# Patient Record
Sex: MALE | Race: WHITE | Employment: OTHER | URBAN - METROPOLITAN AREA
[De-identification: names, ages, dates, MRNs, and addresses within clinical notes are randomized per-mention and may not be internally consistent; named-entity substitution may affect disease eponyms.]

---

## 2017-09-19 ENCOUNTER — OP HISTORICAL/CONVERTED ENCOUNTER (OUTPATIENT)
Dept: OTHER | Age: 77
End: 2017-09-19

## 2018-07-31 ENCOUNTER — OP HISTORICAL/CONVERTED ENCOUNTER (OUTPATIENT)
Dept: OTHER | Age: 78
End: 2018-07-31

## 2018-09-20 ENCOUNTER — OP HISTORICAL/CONVERTED ENCOUNTER (OUTPATIENT)
Dept: OTHER | Age: 78
End: 2018-09-20

## 2019-09-27 ENCOUNTER — OP HISTORICAL/CONVERTED ENCOUNTER (OUTPATIENT)
Dept: OTHER | Age: 79
End: 2019-09-27

## 2020-01-16 ENCOUNTER — OP HISTORICAL/CONVERTED ENCOUNTER (OUTPATIENT)
Dept: OTHER | Age: 80
End: 2020-01-16

## 2020-01-28 ENCOUNTER — OP HISTORICAL/CONVERTED ENCOUNTER (OUTPATIENT)
Dept: OTHER | Age: 80
End: 2020-01-28

## 2020-08-21 VITALS
HEIGHT: 71 IN | HEART RATE: 68 BPM | WEIGHT: 216.8 LBS | DIASTOLIC BLOOD PRESSURE: 64 MMHG | SYSTOLIC BLOOD PRESSURE: 144 MMHG | BODY MASS INDEX: 30.35 KG/M2 | OXYGEN SATURATION: 98 %

## 2020-08-21 PROBLEM — H93.90 EAR PROBLEM: Status: ACTIVE | Noted: 2020-08-21

## 2020-08-21 PROBLEM — I10 HTN (HYPERTENSION): Status: ACTIVE | Noted: 2020-08-21

## 2020-08-21 PROBLEM — H91.90 DECREASED HEARING: Status: ACTIVE | Noted: 2020-08-21

## 2020-08-21 PROBLEM — E78.00 HIGH CHOLESTEROL: Status: ACTIVE | Noted: 2020-08-21

## 2020-10-22 RX ORDER — POTASSIUM CHLORIDE 1500 MG/1
TABLET, EXTENDED RELEASE ORAL
Qty: 90 TAB | Refills: 3 | Status: SHIPPED | OUTPATIENT
Start: 2020-10-22 | End: 2021-12-17

## 2021-03-08 DIAGNOSIS — I10 ESSENTIAL (PRIMARY) HYPERTENSION: ICD-10-CM

## 2021-03-09 RX ORDER — AMLODIPINE BESYLATE 5 MG/1
TABLET ORAL
Qty: 90 TAB | Refills: 4 | Status: SHIPPED | OUTPATIENT
Start: 2021-03-09 | End: 2021-04-15 | Stop reason: SDUPTHER

## 2021-03-22 ENCOUNTER — OFFICE VISIT (OUTPATIENT)
Dept: FAMILY MEDICINE CLINIC | Age: 81
End: 2021-03-22
Payer: MEDICARE

## 2021-03-22 VITALS
WEIGHT: 213 LBS | TEMPERATURE: 97.7 F | OXYGEN SATURATION: 96 % | DIASTOLIC BLOOD PRESSURE: 73 MMHG | BODY MASS INDEX: 29.82 KG/M2 | SYSTOLIC BLOOD PRESSURE: 139 MMHG | HEIGHT: 71 IN | HEART RATE: 56 BPM

## 2021-03-22 DIAGNOSIS — I48.91 ATRIAL FIBRILLATION, UNSPECIFIED TYPE (HCC): ICD-10-CM

## 2021-03-22 DIAGNOSIS — Z12.5 SCREENING FOR PROSTATE CANCER: ICD-10-CM

## 2021-03-22 DIAGNOSIS — C61 PRIMARY MALIGNANT NEOPLASM OF PROSTATE (HCC): ICD-10-CM

## 2021-03-22 DIAGNOSIS — E78.00 HIGH CHOLESTEROL: ICD-10-CM

## 2021-03-22 DIAGNOSIS — Z00.00 MEDICARE ANNUAL WELLNESS VISIT, SUBSEQUENT: ICD-10-CM

## 2021-03-22 DIAGNOSIS — I10 ESSENTIAL (PRIMARY) HYPERTENSION: Primary | ICD-10-CM

## 2021-03-22 DIAGNOSIS — M16.0 PRIMARY OSTEOARTHRITIS OF BOTH HIPS: ICD-10-CM

## 2021-03-22 DIAGNOSIS — Z13.31 SCREENING FOR DEPRESSION: ICD-10-CM

## 2021-03-22 DIAGNOSIS — Z13.39 SCREENING FOR ALCOHOLISM: ICD-10-CM

## 2021-03-22 PROCEDURE — G0442 ANNUAL ALCOHOL SCREEN 15 MIN: HCPCS | Performed by: FAMILY MEDICINE

## 2021-03-22 PROCEDURE — G8754 DIAS BP LESS 90: HCPCS | Performed by: FAMILY MEDICINE

## 2021-03-22 PROCEDURE — 1101F PT FALLS ASSESS-DOCD LE1/YR: CPT | Performed by: FAMILY MEDICINE

## 2021-03-22 PROCEDURE — G8510 SCR DEP NEG, NO PLAN REQD: HCPCS | Performed by: FAMILY MEDICINE

## 2021-03-22 PROCEDURE — 99213 OFFICE O/P EST LOW 20 MIN: CPT | Performed by: FAMILY MEDICINE

## 2021-03-22 PROCEDURE — G8427 DOCREV CUR MEDS BY ELIG CLIN: HCPCS | Performed by: FAMILY MEDICINE

## 2021-03-22 PROCEDURE — G8419 CALC BMI OUT NRM PARAM NOF/U: HCPCS | Performed by: FAMILY MEDICINE

## 2021-03-22 PROCEDURE — G0444 DEPRESSION SCREEN ANNUAL: HCPCS | Performed by: FAMILY MEDICINE

## 2021-03-22 PROCEDURE — G8536 NO DOC ELDER MAL SCRN: HCPCS | Performed by: FAMILY MEDICINE

## 2021-03-22 PROCEDURE — G0439 PPPS, SUBSEQ VISIT: HCPCS | Performed by: FAMILY MEDICINE

## 2021-03-22 PROCEDURE — G8752 SYS BP LESS 140: HCPCS | Performed by: FAMILY MEDICINE

## 2021-03-22 RX ORDER — ATORVASTATIN CALCIUM 10 MG/1
TABLET, FILM COATED ORAL
COMMUNITY
Start: 2021-01-15

## 2021-03-22 RX ORDER — AMIODARONE HYDROCHLORIDE 200 MG/1
TABLET ORAL
COMMUNITY
Start: 2021-03-08

## 2021-03-22 NOTE — PROGRESS NOTES
This is the Subsequent Medicare Annual Wellness Exam, performed 12 months or more after the Initial AWV or the last Subsequent AWV    I have reviewed the patient's medical history in detail and updated the computerized patient record. Depression Risk Factor Screening:     3 most recent PHQ Screens 3/22/2021   Little interest or pleasure in doing things Not at all   Feeling down, depressed, irritable, or hopeless Several days   Total Score PHQ 2 1       Alcohol Risk Screen    Do you average more than 1 drink per night or more than 7 drinks a week: No    In the past three months have you have had more than 4 drinks containing alcohol on one occasion: No        Functional Ability and Level of Safety:    Hearing: Hearing is good. The patient wears hearing aids. Activities of Daily Living: The home contains: handrails and grab bars  Patient does total self care      Ambulation: with no difficulty     Fall Risk:  Fall Risk Assessment, last 12 mths 3/22/2021   Able to walk? Yes   Fall in past 12 months? 0   Do you feel unsteady? 1   Are you worried about falling 0   Is the gait abnormal? 0      Abuse Screen:  Patient is not abused       Cognitive Screening    Has your family/caregiver stated any concerns about your memory: yes - \"slowing down\" mom had alzeihmers. Cognitive Screening: Normal - MMSE (Mini Mental Status Exam), Clock Drawing Test    Assessment/Plan   Education and counseling provided:  Are appropriate based on today's review and evaluation    Diagnoses and all orders for this visit:    1. Medicare annual wellness visit, subsequent    2.  Screening for alcoholism  -     ID ANNUAL ALCOHOL SCREEN 15 MIN    3. Screening for depression  -     [place] Maintenance Due     Health Maintenance Due   Topic Date Due    Lipid Screen  Never done    COVID-19 Vaccine (1) Never done    DTaP/Tdap/Td series (1 - Tdap) Never done    Shingrix Vaccine Age 50> (1 of 2) Never done   Juliano.Bran Flu Vaccine (1) 09/01/2020       Patient Care Team   Patient Care Team:  Venu Letser DO as PCP - General (Family Medicine)  Venu Lester DO as PCP - [facility] Empaneled Provider    History     Patient Active Problem List   Diagnosis Code    DJD (degenerative joint disease) of hip M16.9    Ear problem H93.90    Decreased hearing H91.90    HTN (hypertension) I10    High cholesterol E78.00     Past Medical History:   Diagnosis Date    Arrhythmia 9/3/14    RECENT DX OF ATRIAL FIBRILLATION    Cancer ([facility] [address]     PROSTATE.  Decreased hearing 8/21/2020    Ear problem 8/21/2020    High cholesterol 8/21/2020    HTN (hypertension) 8/21/2020    Hypertension     CONTROLLED BY MEDS.  Other ill-defined conditions(799.89)     Other ill-defined conditions(799.89)     hypercholesterolemia      Past Surgical History:   Procedure Laterality Date    HX APPENDECTOMY      HX CATARACT REMOVAL      both eyes    HX HEENT      repair of left eye detached retina    HX ORTHOPAEDIC      left knee replacement    HX PROSTATECTOMY  5/8/02     Current Outpatient Medications   Medication Sig Dispense Refill    amiodarone (CORDARONE) 200 mg tablet TAKE 1/2 TABLET BY MOUTH DAILY      atorvastatin (LIPITOR) 10 mg tablet TAKE 1 TABLET BY MOUTH EVERY DAY      amLODIPine (NORVASC) 5 mg tablet TAKE 1 TABLET BY MOUTH EVERY DAY 90 Tab 4    Klor-Con M20 20 mEq tablet TAKE 1 TABLET BY MOUTH EVERY DAY 90 Tab 3    lisinopril (PRINIVIL, ZESTRIL) 40 mg tablet Take 40 mg by mouth daily.  Hydrochlorothiazide 12.5 mg tablet Take 12.5 mg by mouth daily.  oxyCODONE IR (ROXICODONE) 5 mg immediate release tablet Take 1-2 tablets by mouth every four (4) hours as needed for Pain. 80 tablet 0    rivaroxaban (XARELTO) 20 mg tab tablet Take 20 mg by mouth daily (with dinner).  carvedilol (COREG) 12.5 mg tablet Take 12.5 mg by mouth two (2) times daily (with meals).  Takes in am & pm      simvastatin (ZOCOR) 20 mg tablet Take 20 mg by mouth nightly. No Known Allergies    Family History   Problem Relation Age of Onset    Heart Disease Mother     Cancer Mother         NOT SURE. GYN ISSUES.  Stroke Father      Social History     Tobacco Use    Smoking status: Never Smoker    Smokeless tobacco: Never Used   Substance Use Topics    Alcohol use: Yes     Alcohol/week: 0.8 standard drinks     Types: 1 Cans of beer per week     Comment: 1-2 per month   This note for annual wellness visit has been reviewed in its entirety and is dicussed thoroughly in my note including 3 word recall, clock draw test, discussion of advance directive , primary prevention, vaccinations, all age and gender appropriate screening exams, and safety concerns. Orders generated in My note if appropriate.     Asa Ramey, DO

## 2021-03-22 NOTE — PATIENT INSTRUCTIONS
Medicare Wellness Visit, Male The best way to live healthy is to have a lifestyle where you eat a well-balanced diet, exercise regularly, limit alcohol use, and quit all forms of tobacco/nicotine, if applicable. Regular preventive services are another way to keep healthy. Preventive services (vaccines, screening tests, monitoring & exams) can help personalize your care plan, which helps you manage your own care. Screening tests can find health problems at the earliest stages, when they are easiest to treat. [facility] follows the current, evidence-based guidelines published by the [facility] Darius Kamila ([facility]) when recommending preventive services for our patients. Because we follow these guidelines, sometimes recommendations change over time as research supports it. (For example, a prostate screening blood test is no longer routinely recommended for men with no symptoms). Of course, you and your doctor may decide to screen more often for some diseases, based on your risk and co-morbidities (chronic disease you are already diagnosed with). Preventive services for you include: - Medicare offers their members a free annual wellness visit, which is time for you and your primary care provider to discuss and plan for your preventive service needs. Take advantage of this benefit every year! 
-All adults over age 72 should receive the recommended pneumonia vaccines. Current USPSTF guidelines recommend a series of two vaccines for the best pneumonia protection.  
-All adults should have a flu vaccine yearly and tetanus vaccine every 10 years. 
-All adults age 48 and older should receive the shingles vaccines (series of two vaccines).       
-All adults age 38-68 who are overweight should have a diabetes screening test once every three years.  
-Other screening tests & preventive services for persons with diabetes include: an eye exam to screen for diabetic retinopathy, a kidney function test, a foot exam, and stricter control over your cholesterol.  
-Cardiovascular screening for adults with routine risk involves an electrocardiogram (ECG) at intervals determined by the provider.  
-Colorectal cancer screening should be done for adults age 54-65 with no increased risk factors for colorectal cancer. There are a number of acceptable methods of screening for this type of cancer. Each test has its own benefits and drawbacks. Discuss with your provider what is most appropriate for you during your annual wellness visit. The different tests include: colonoscopy (considered the best screening method), a fecal occult blood test, a fecal DNA test, and sigmoidoscopy. 
-All adults born between [facility] should be screened once for Hepatitis C. 
-An Abdominal Aortic Aneurysm (AAA) Screening is recommended for men age 73-68 who has ever smoked in their lifetime. Here is a list of your current Health Maintenance items (your personalized list of preventive services) with a due date: 
Health Maintenance Due Topic Date Due  Cholesterol Test   Never done  COVID-19 Vaccine (1) Never done  DTaP/Tdap/Td  (1 - Tdap) Never done  Shingles Vaccine (1 of 2) Never done  Yearly Flu Vaccine (1) 09/01/2020 General Health and concerns: HEART HEALTHY DIET: 
A heart healthy diet is one that is low in cholesterol (less than 300 mg daily), fat (less than 80 g daily) . You should also minimize carbohydrates / sugars (less amounts of breads, pastas, potato and potato products and sugary foods/snacks, cookies, cakes, etc) . Try to eat whole wheat/multigrain breads and pastas and eat more vegetables. Cook with olive oil (or no oil) and grill, bake, broil or boil foods. Less red meat and more chicken , fish and lean cuts of beef (limited). 0495-7097 calories per day is sufficient 0764-6001 is acceptable for weight loss.    
 
EXERCISE: 
You should do exercise 3-5 days per week (minimum) to include increasing your heart rate for 30 to 45 minutes. At least a pace of a brisk walk should do that. This build up your heart and lung endurance and muscles and helps many function of the body. OTHER: 
     
Routine Health maintenance: You need to get a yearly follow up/physical exam to review, discuss age and gender appropriate exams, labs, vaccines and screening tests. This includes cardiovascular health risk, cancer screens and other gus related topics. Medications-Take all medications as directed. Please do not stop unless you talk to your doctor or health care provider first. Report any problems immediately. Referrals: if you have been given a referral, please call the office if you do not hear from provider in one week. You may make the appointment yourself. Please keep all appointments with specialists and ask them to send their notes, thoughts, recommendations to us , as your PCP. Imaging/Labs:  Be sure to get these images in a timely manner. IF your test must be scheduled, let us know if you need help getting this done and if you do not hear from that provider in a week , call us or them. BE SURE to call the office if you do not hear regarding the results in one week after the test is performed Image or lab). It is our intention to inform you of the results ALWAYS, even if normal you should get a notification (Call, portal message). PLEASE amara if you do not get the results. PLEASE follow all recommendations and call/come in /ask questions if you do not understand of if problems develop after or in between visits. Failure to comply with recommended health care advise could result in serious health consequences. Thank you for choosing our practice and please let us know how we can help you feel better and stay well!

## 2021-03-22 NOTE — PROGRESS NOTES
IDENTIFYING INFORMATION:  Merrick Angelo , 78 y.o., male  [address] [facility],  [place]     CHIEF COMPLAINT:   Chief Complaint   Patient presents with    Annual Wellness Visit    Knee Pain     c/o knee pain     HISTORY OF PRESENT ILLNESS:  Merrick Angelo is a 78 y.o. male  has a past medical history of Arrhythmia (9/3/14), Cancer ([facility] [address] Decreased hearing (8/21/2020), Ear problem (8/21/2020), High cholesterol (8/21/2020), HTN (hypertension) (8/21/2020), Hypertension, Other ill-defined conditions(799.89), and Other ill-defined conditions(799.89). .  he comes in for follow up on htn, lipids and needs some labs. Does have some urinary frequency and gets up 3-4 times at night to urinate. History of prostate cancer with prostatectomy. He is fairly active for his age . Sees cardiology also   NO chest pain, short of breath or edema. PAST MEDICAL HISTORY:   Past Medical History:   Diagnosis Date    Arrhythmia 9/3/14    RECENT DX OF ATRIAL FIBRILLATION    Cancer ([facility] [address]     PROSTATE.  Decreased hearing 8/21/2020    Ear problem 8/21/2020    High cholesterol 8/21/2020    HTN (hypertension) 8/21/2020    Hypertension     CONTROLLED BY MEDS.  Other ill-defined conditions(799.89)     Other ill-defined conditions(799.89)     hypercholesterolemia       MEDICATIONS:   Current Outpatient Medications on File Prior to Visit   Medication Sig Dispense Refill    amiodarone (CORDARONE) 200 mg tablet TAKE 1/2 TABLET BY MOUTH DAILY      atorvastatin (LIPITOR) 10 mg tablet TAKE 1 TABLET BY MOUTH EVERY DAY      amLODIPine (NORVASC) 5 mg tablet TAKE 1 TABLET BY MOUTH EVERY DAY 90 Tab 4    Klor-Con M20 20 mEq tablet TAKE 1 TABLET BY MOUTH EVERY DAY 90 Tab 3    lisinopril (PRINIVIL, ZESTRIL) 40 mg tablet Take 40 mg by mouth daily.  Hydrochlorothiazide 12.5 mg tablet Take 12.5 mg by mouth daily.       oxyCODONE IR (ROXICODONE) 5 mg immediate release tablet Take 1-2 tablets by mouth every four (4) hours as needed for Pain. 80 tablet 0    [DISCONTINUED] potassium chloride SR (K-TAB) 20 mEq tablet Take 10 mEq by mouth daily. Indications: low amount of potassium in the blood      rivaroxaban (XARELTO) 20 mg tab tablet Take 20 mg by mouth daily (with dinner).  carvedilol (COREG) 12.5 mg tablet Take 12.5 mg by mouth two (2) times daily (with meals). Takes in am & pm      simvastatin (ZOCOR) 20 mg tablet Take 20 mg by mouth nightly. No current facility-administered medications on file prior to visit. ALLERGIES:  No Known Allergies      SOCIAL HISTORY:   Social History     Tobacco Use    Smoking status: Never Smoker    Smokeless tobacco: Never Used   Substance Use Topics    Alcohol use: Yes     Alcohol/week: 0.8 standard drinks     Types: 1 Cans of beer per week     Frequency: Monthly or less     Drinks per session: 3 or 4     Binge frequency: Less than monthly     Comment: 1-2 per month    Drug use: No       SURGICAL HISTORY:  Past Surgical History:   Procedure Laterality Date    HX APPENDECTOMY      HX CATARACT REMOVAL      both eyes    HX HEENT      repair of left eye detached retina    HX ORTHOPAEDIC      left knee replacement    HX PROSTATECTOMY  5/8/02        FAMILY HISTORY:  Family History   Problem Relation Age of Onset    Heart Disease Mother     Cancer Mother         NOT SURE. GYN ISSUES.  Stroke Father          REVIEW OF SYSTEMS:  I personally collected this information from all available source present (patient/others in room and records available) -JLEWBETTY    Review of Systems   Constitutional: Positive for fatigue. Negative for activity change, appetite change, chills, diaphoresis, fever and unexpected weight change. HENT: Negative for congestion, ear discharge, ear pain, hearing loss, rhinorrhea, sinus pressure, sneezing, sore throat, tinnitus, trouble swallowing and voice change. Eyes: Negative for photophobia, pain, discharge and visual disturbance.    Respiratory: Positive for shortness of breath (with long exertional). Negative for cough, chest tightness and wheezing. Cardiovascular: Negative for chest pain, palpitations and leg swelling. Gastrointestinal: Negative for abdominal distention, abdominal pain, blood in stool, constipation, diarrhea, nausea and vomiting. Endocrine: Negative for cold intolerance, heat intolerance, polydipsia and polyphagia. Genitourinary: Negative for difficulty urinating, dysuria, frequency, hematuria and urgency. Musculoskeletal: Positive for arthralgias, back pain, joint swelling and myalgias. Negative for gait problem and neck pain. Skin: Negative for color change and rash. Neurological: Negative for dizziness, tremors, syncope, speech difficulty, weakness, light-headedness, numbness and headaches. Hematological: Negative for adenopathy. Does not bruise/bleed easily. Psychiatric/Behavioral: Negative for agitation, behavioral problems, confusion, decreased concentration, dysphoric mood, hallucinations, sleep disturbance and suicidal ideas. The patient is not nervous/anxious. PHYSICAL EXAMINATION:  Vital Signs:    Visit Vitals  /73 (BP 1 Location: Left upper arm, BP Patient Position: Sitting)   Pulse (!) 56   Temp 97.7 °F (36.5 °C) (Temporal)   Ht 5' 11\" (1.803 m)   Wt 213 lb (96.6 kg)   SpO2 96%   BMI 29.71 kg/m²         Wt Readings from Last 3 Encounters:   03/22/21 213 lb (96.6 kg)   02/25/20 216 lb 12.8 oz (98.3 kg)   09/17/14 212 lb (96.2 kg)     BP Readings from Last 3 Encounters:   03/22/21 139/73   02/25/20 144/64   09/19/14 123/79         Physical Exam  Nursing note reviewed. Constitutional:       General: He is not in acute distress. Appearance: Normal appearance. He is obese. He is not ill-appearing or toxic-appearing. HENT:      Right Ear: Tympanic membrane normal.      Nose: Congestion present. No rhinorrhea.       Mouth/Throat:      Pharynx: No oropharyngeal exudate or posterior oropharyngeal erythema. Eyes:      General: No scleral icterus. Extraocular Movements: Extraocular movements intact. Conjunctiva/sclera: Conjunctivae normal.      Pupils: Pupils are equal, round, and reactive to light. Neck:      Musculoskeletal: No neck rigidity or muscular tenderness. Vascular: No carotid bruit. Cardiovascular:      Rate and Rhythm: Normal rate and regular rhythm. Heart sounds: No murmur. No friction rub. No gallop. Pulmonary:      Effort: Pulmonary effort is normal.      Breath sounds: Normal breath sounds. No wheezing, rhonchi or rales. Abdominal:      General: Bowel sounds are normal. There is no distension. Palpations: Abdomen is soft. There is no mass. Tenderness: There is no abdominal tenderness. Musculoskeletal:         General: Swelling, tenderness and deformity present. Right lower leg: No edema. Left lower leg: No edema. Lymphadenopathy:      Cervical: No cervical adenopathy. Skin:     Capillary Refill: Capillary refill takes less than 2 seconds. Coloration: Skin is not jaundiced. Findings: No erythema or rash. Neurological:      General: No focal deficit present. Mental Status: He is alert and oriented to person, place, and time. Mental status is at baseline. Cranial Nerves: No cranial nerve deficit. Sensory: No sensory deficit. Coordination: Coordination normal.      Gait: Gait normal.   Psychiatric:         Mood and Affect: Mood normal.         Behavior: Behavior normal.         Thought Content: Thought content normal.         Judgment: Judgment normal.     Three Word Registration and RECALL:      3/3  Clock Drawin/2  Total:             ASSESSMENT/PLAN:    Discussion (regarding today's visit with Apolinar Lang); ANNUAL WELLNESS VISIT PERFORMED  1. Nursing staff wellness visit note reviewed.     2.  Advanced directives were discussed with the patient and information was given if appropriate. 3. Tobacco use, alcohol screening, weight and body mass index, level of physical activity, nutrition, fall risk screenings were done. 4. We also reviewed and discussed vaccinations. Those were ordered if indicated and patient requested. 5. We discussed prostate specific antigen screening, digital rectal exam.  Those were ordered if indicated. 6. We discussed colon cancer screening, eye exam, depression screening, mental status/cognition and pain levels. Those items addressed with the patient were ordered this visit if indicated. 7. Mentation is in medical nursing staff note and my office visit. 8. Reviewed all information as noted. 9.  Depression screen was negative. 10.  Alcohol screen was negative. ICD-10-CM ICD-9-CM    1. Essential (primary) hypertension  I10 401.9 CBC WITH AUTOMATED DIFF      METABOLIC PANEL, COMPREHENSIVE      LIPID PANEL      TSH 3RD GENERATION      URINALYSIS W/ RFLX MICROSCOPIC   2. Medicare annual wellness visit, subsequent  Z00.00 V70.0    3. Screening for alcoholism  Z13.39 V79.1 [place] ANNUAL ALCOHOL SCREEN 15 MIN   4. Screening for depression  Z13.31 V79.0 DEPRESSION SCREEN ANNUAL   5. Primary osteoarthritis of both hips  M16.0 715.15    6. High cholesterol  E78.00 272.0    7. Screening for prostate cancer  Z12.5 V76.44 PSA SCREENING (SCREENING)   8. [facility], unspecified [facility]  I48.91 427.31    9. Primary malignant neoplasm of prostate ([facility]  C61 185      WE reviewed each diagnosis listed for today's visit including medications, treatment, testing such as labs, imagine, referrals and when to call regarding results and appointments. Reminded patient to keep any and all appointments with specialists, labs, imaging. Reminded patient to make sure we get copies of any specialists care, labs and imaging. Reminded patient to call of come by the office if there are any concerns, questions , comments or problems.     The patient verbalized understanding of the care plan and all questions were answered to the patient's satisfaction prior to leaving the office. The patient was told that failure to comply with recommended testing could result in abnormal health consequences. The patient was instructed to have yearly routine health maintenance including but not limited to age appropriate vaccines, testing, screening exams. ALL questions were answered to his satisfaction before leaving the office. The patient actively participated in medical decision making. FOLLOW UP:   Patient knows to keep any and all future visits scheduled unless told otherwise. Patient knows to call, come back if any concerns, questions, comments or problems arise. Follow-up and Dispositions    · Return in about 6 months (around 9/22/2021) for follow up lipids, afib, htn, lipids, oa knees. This visit may have been completed , in part, with voice recognition software as well as typing and may have syntax errors despite editing.       Renny Mast DO

## 2021-03-24 LAB
ALBUMIN SERPL-MCNC: 4.6 G/DL (ref 3.6–4.6)
ALBUMIN/GLOB SERPL: 1.6 {RATIO} (ref 1.2–2.2)
ALP SERPL-CCNC: 84 IU/L (ref 39–117)
ALT SERPL-CCNC: 13 IU/L (ref 0–44)
APPEARANCE UR: CLEAR
AST SERPL-CCNC: 17 IU/L (ref 0–40)
BASOPHILS # BLD AUTO: 0.1 X10E3/UL (ref 0–0.2)
BASOPHILS NFR BLD AUTO: 1 %
BILIRUB SERPL-MCNC: 0.8 MG/DL (ref 0–1.2)
BILIRUB UR QL STRIP: NEGATIVE
BUN SERPL-MCNC: 14 MG/DL (ref 8–27)
BUN/CREAT SERPL: 14 (ref 10–24)
CALCIUM SERPL-MCNC: 9.4 MG/DL (ref 8.6–10.2)
CHLORIDE SERPL-SCNC: 101 MMOL/L (ref 96–106)
CHOLEST SERPL-MCNC: 169 MG/DL (ref 100–199)
CO2 SERPL-SCNC: 27 MMOL/L (ref 20–29)
COLOR UR: YELLOW
CREAT SERPL-MCNC: 1.01 MG/DL (ref 0.76–1.27)
EOSINOPHIL # BLD AUTO: 0.3 X10E3/UL (ref 0–0.4)
EOSINOPHIL NFR BLD AUTO: 5 %
ERYTHROCYTE [DISTWIDTH] IN BLOOD BY AUTOMATED COUNT: 13.1 % (ref 11.6–15.4)
GLOBULIN SER CALC-MCNC: 2.8 G/DL (ref 1.5–4.5)
GLUCOSE SERPL-MCNC: 87 MG/DL (ref 65–99)
GLUCOSE UR QL: NEGATIVE
HCT VFR BLD AUTO: 46.1 % (ref 37.5–51)
HDLC SERPL-MCNC: 57 MG/DL
HGB BLD-MCNC: 15.2 G/DL (ref 13–17.7)
HGB UR QL STRIP: NEGATIVE
IMM GRANULOCYTES # BLD AUTO: 0.1 X10E3/UL (ref 0–0.1)
IMM GRANULOCYTES NFR BLD AUTO: 1 %
KETONES UR QL STRIP: NEGATIVE
LDLC SERPL CALC-MCNC: 92 MG/DL (ref 0–99)
LEUKOCYTE ESTERASE UR QL STRIP: NEGATIVE
LYMPHOCYTES # BLD AUTO: 1.1 X10E3/UL (ref 0.7–3.1)
LYMPHOCYTES NFR BLD AUTO: 17 %
MCH RBC QN AUTO: 28.8 PG (ref 26.6–33)
MCHC RBC AUTO-ENTMCNC: 33 G/DL (ref 31.5–35.7)
MCV RBC AUTO: 87 FL (ref 79–97)
MICRO URNS: NORMAL
MONOCYTES # BLD AUTO: 0.6 X10E3/UL (ref 0.1–0.9)
MONOCYTES NFR BLD AUTO: 9 %
NEUTROPHILS # BLD AUTO: 4.7 X10E3/UL (ref 1.4–7)
NEUTROPHILS NFR BLD AUTO: 67 %
NITRITE UR QL STRIP: NEGATIVE
PH UR STRIP: 7.5 [PH] (ref 5–7.5)
PLATELET # BLD AUTO: 322 X10E3/UL (ref 150–450)
POTASSIUM SERPL-SCNC: 3.2 MMOL/L (ref 3.5–5.2)
PROT SERPL-MCNC: 7.4 G/DL (ref 6–8.5)
PROT UR QL STRIP: NEGATIVE
PSA SERPL-MCNC: 0.4 NG/ML (ref 0–4)
RBC # BLD AUTO: 5.28 X10E6/UL (ref 4.14–5.8)
SODIUM SERPL-SCNC: 144 MMOL/L (ref 134–144)
SP GR UR: 1.02 (ref 1–1.03)
TRIGL SERPL-MCNC: 111 MG/DL (ref 0–149)
TSH SERPL DL<=0.005 MIU/L-ACNC: 1.27 UIU/ML (ref 0.45–4.5)
UROBILINOGEN UR STRIP-MCNC: 0.2 MG/DL (ref 0.2–1)
VLDLC SERPL CALC-MCNC: 20 MG/DL (ref 5–40)
WBC # BLD AUTO: 6.9 X10E3/UL (ref 3.4–10.8)

## 2021-03-30 NOTE — PROGRESS NOTES
Blood count is normal without anemia or infection. Sugar, kidneys, liver test is normal.  Cholesterol is excellent.   Thyroid, urinalysis, prostate are normal.

## 2021-04-13 RX ORDER — LISINOPRIL 40 MG/1
TABLET ORAL
Qty: 90 TAB | Refills: 4 | Status: SHIPPED | OUTPATIENT
Start: 2021-04-13 | End: 2022-04-18 | Stop reason: SDUPTHER

## 2021-04-15 DIAGNOSIS — I10 ESSENTIAL (PRIMARY) HYPERTENSION: ICD-10-CM

## 2021-04-15 RX ORDER — AMLODIPINE BESYLATE 5 MG/1
TABLET ORAL
Qty: 90 TAB | Refills: 4 | Status: SHIPPED | OUTPATIENT
Start: 2021-04-15 | End: 2022-04-18 | Stop reason: SDUPTHER

## 2021-04-21 PROBLEM — Z00.00 MEDICARE ANNUAL WELLNESS VISIT, SUBSEQUENT: Status: RESOLVED | Noted: 2021-03-22 | Resolved: 2021-04-21

## 2021-08-03 PROBLEM — I10 HTN (HYPERTENSION): Status: RESOLVED | Noted: 2020-08-21 | Resolved: 2021-08-03

## 2021-08-16 ENCOUNTER — OFFICE VISIT (OUTPATIENT)
Dept: ENT CLINIC | Age: 81
End: 2021-08-16
Payer: MEDICARE

## 2021-08-16 VITALS
DIASTOLIC BLOOD PRESSURE: 90 MMHG | OXYGEN SATURATION: 98 % | BODY MASS INDEX: 29.4 KG/M2 | TEMPERATURE: 98.4 F | WEIGHT: 210 LBS | SYSTOLIC BLOOD PRESSURE: 148 MMHG | RESPIRATION RATE: 16 BRPM | HEIGHT: 71 IN | HEART RATE: 61 BPM

## 2021-08-16 DIAGNOSIS — H92.01 OTALGIA, RIGHT: ICD-10-CM

## 2021-08-16 DIAGNOSIS — H61.23 BILATERAL IMPACTED CERUMEN: ICD-10-CM

## 2021-08-16 DIAGNOSIS — H90.3 SENSORINEURAL HEARING LOSS (SNHL), BILATERAL: Primary | ICD-10-CM

## 2021-08-16 PROCEDURE — G8755 DIAS BP > OR = 90: HCPCS | Performed by: OTOLARYNGOLOGY

## 2021-08-16 PROCEDURE — G8536 NO DOC ELDER MAL SCRN: HCPCS | Performed by: OTOLARYNGOLOGY

## 2021-08-16 PROCEDURE — G8753 SYS BP > OR = 140: HCPCS | Performed by: OTOLARYNGOLOGY

## 2021-08-16 PROCEDURE — 99213 OFFICE O/P EST LOW 20 MIN: CPT | Performed by: OTOLARYNGOLOGY

## 2021-08-16 PROCEDURE — 1101F PT FALLS ASSESS-DOCD LE1/YR: CPT | Performed by: OTOLARYNGOLOGY

## 2021-08-16 PROCEDURE — G8419 CALC BMI OUT NRM PARAM NOF/U: HCPCS | Performed by: OTOLARYNGOLOGY

## 2021-08-16 PROCEDURE — 69210 REMOVE IMPACTED EAR WAX UNI: CPT | Performed by: OTOLARYNGOLOGY

## 2021-08-16 PROCEDURE — G8432 DEP SCR NOT DOC, RNG: HCPCS | Performed by: OTOLARYNGOLOGY

## 2021-08-16 PROCEDURE — G8427 DOCREV CUR MEDS BY ELIG CLIN: HCPCS | Performed by: OTOLARYNGOLOGY

## 2021-08-16 NOTE — LETTER
8/17/2021    Patient: Cale Reed   YOB: 1940   Date of Visit: 8/16/2021     Cathy Alex DO  [address] [place] [id number]  [address] [place]    Dear Cathy Alex DO,      Thank you for referring Mr. Ortega Dominguez to [facility] [address] for evaluation. My notes for this consultation are attached. If you have questions, please do not hesitate to call me. I look forward to following your patient along with you.       Sincerely,    Jesus Lancaster MD

## 2021-08-16 NOTE — PROGRESS NOTES
Otolaryngology-Head and Neck Surgery  Follow Up Patient Visit     Patient: Oral Simon  YOB: 1940  MRN: [id number]  Date of Service:  8/16/2021    Chief Complaint:   Chief Complaint   Patient presents with    Hearing Problem     Patient is c/o decreased in hearing and pain in right ear.  Ear Pain         History of Present Illness: Oral Simon is a 78y.o. year old male who was presents for evaluation of his ears. Hx of hearing loss, wearing hearing aids only in the right ear. Notes recently some wax on hearing aid and sound sensitivity on the right    Right ear feels sensitive only when wearing aid, otherwise feels normal      Past Medical History:  Past Medical History:   Diagnosis Date    Arrhythmia 9/3/14    RECENT DX OF ATRIAL FIBRILLATION    Cancer ([facility] [place] [address]     PROSTATE.  Decreased hearing 8/21/2020    Ear problem 8/21/2020    High cholesterol 8/21/2020    HTN (hypertension) 8/21/2020    Hypertension     CONTROLLED BY MEDS.     Other ill-defined conditions(799.89)     Other ill-defined conditions(799.89)     hypercholesterolemia       Past Surgical History:   Past Surgical History:   Procedure Laterality Date    HX APPENDECTOMY      HX CATARACT REMOVAL      both eyes    HX HEENT      repair of left eye detached retina    HX ORTHOPAEDIC      left knee replacement    HX PROSTATECTOMY  5/8/02       Medications:   Current Outpatient Medications   Medication Instructions    amiodarone (CORDARONE) 200 mg tablet TAKE 1/2 TABLET BY MOUTH DAILY    amLODIPine (NORVASC) 5 mg tablet TAKE 1 TABLET BY MOUTH EVERY DAY    atorvastatin (LIPITOR) 10 mg tablet TAKE 1 TABLET BY MOUTH EVERY DAY    carvediloL (COREG) 12.5 mg, 2 TIMES DAILY WITH MEALS    hydroCHLOROthiazide (HYDRODIURIL) 12.5 mg, DAILY    Klor-Con M20 20 mEq tablet TAKE 1 TABLET BY MOUTH EVERY DAY    lisinopriL (PRINIVIL, ZESTRIL) 40 mg tablet TAKE 1 TABLET BY MOUTH EVERY DAY BEFORE A MEAL    oxyCODONE IR (Zoey Aguayo) 5-10 mg, Oral, EVERY 4 HOURS AS NEEDED    rivaroxaban (XARELTO) 20 mg, DAILY WITH DINNER    simvastatin (ZOCOR) 20 mg, EVERY BEDTIME       Allergies:   No Known Allergies    Social History:   Social History     Tobacco Use    Smoking status: Never Smoker    Smokeless tobacco: Never Used   Substance Use Topics    Alcohol use: Yes     Alcohol/week: 0.8 standard drinks     Types: 1 Cans of beer per week     Comment: 1-2 per month    Drug use: No       Family History:  Family History   Problem Relation Age of Onset    Heart Disease Mother     Cancer Mother         NOT SURE. GYN ISSUES.  Stroke Father        Review of Systems:  Consitutional: denies fever, excessive weight gain or loss. Eyes: denies diplopia, eye pain. Integumentary: denies new concerning skin lesions. Ears, Nose, Mouth, Throat: denies except as per HPI. Endocrine: denies hot or cold intolerance, increased thirst.  Respiratory: denies cough, hemoptysis, wheezing  Gastrointestinal: denies trouble swallowing, nausea, emesis, regurgitation  Musculoskeletal: denies muscle weakness or wasting  Cardiovascular: denies chest pain, shortness of breath  Neurologic: denies seizures, numbness or tingling, syncope  Hematologic: denies easy bleeding or bruising    Physical Examination:   Vitals:    08/16/21 0856   BP: (!) 148/90   BP 1 Location: Left upper arm   BP Patient Position: Sitting   BP Cuff Size: Large adult   Pulse: 61   Temp: 98.4 °F (36.9 °C)   TempSrc: Temporal   Resp: 16   Height: 5' 11\" (1.803 m)   Weight: 210 lb (95.3 kg)   SpO2: 98%         General: Comfortable, pleasant, appears stated age  Voice: Strong, speaking in full sentences, no stridor    Face: No masses or lesions, facial strength symmetric   Ears: External ears unremarkable. Bilateral medial cerumen impactions. Following debridement, TM clear and intact, with visible landmarks. Clear middle ear space  Nose: External nose unremarkable. Dorsum midline.  Anterior rhinoscopy demonstrates no lesions. Septum midline. Turbinates without hypertrophy. Oral Cavity / Oropharynx: No trismus. Mucosa pink and moist. No lesions. Tongue is midline and mobile. Palate elevates symmetrically. Uvula midline. Tonsils unremarkable. Base of tongue soft. Floor of mouth soft. Neck: Supple. No adenopathy. Thyroid unremarkable. Palpable laryngeal landmarks. Full neck range of motion   Neurologic: CN II - XI intact. Normal gait    PROCEDURE: BILATERAL MICROSCOPY WITH CERUMEN DEBRIDEMENT    Using the microscope, both ears were examined. A 5 Fr suction and right angle were used to debride the ears of cerumen revealing a clear and intact TM bilaterally. Patient tolerated the procedure well       Assessment and Plan:   1. Bilateral SNHL  2. Bilateral cerumen impactions  3. R otalgia   - Ears with bilateral medial cerumen impactions  - May be in part impacting fit of hearing aids causing discomfort  - Cerumen debrided as above  - If any pain or sensitivity or residual concerns re: hearing - would make appt with our audiologist to adjust aids and retest hearing   - Offered today, he'd like to see how he feels first        The patient was instructed to return to clinic if no improvement or progression of symptoms. Signs to watch out for reviewed.       MD Claudine Barberova [facility]  [address] [place] [address] 6  [facility]  Office Phone: [phone]

## 2021-08-16 NOTE — PROGRESS NOTES
Visit Vitals  BP (!) 148/90 (BP 1 Location: Left upper arm, BP Patient Position: Sitting, BP Cuff Size: Large adult)   Pulse 61   Temp 98.4 °F (36.9 °C) (Temporal)   Resp 16   Ht 5' 11\" (1.803 m)   Wt 210 lb (95.3 kg)   SpO2 98%   BMI 29.29 kg/m²     Chief Complaint   Patient presents with    Hearing Problem     Patient is c/o decreased in hearing and pain in right ear.  Ear Pain   Last seen by Dr Rasta Louis 2/25/2020.

## 2021-09-22 ENCOUNTER — HOSPITAL ENCOUNTER (OUTPATIENT)
Dept: GENERAL RADIOLOGY | Age: 81
Discharge: HOME | End: 2021-09-22
Payer: MEDICARE

## 2021-09-22 ENCOUNTER — OFFICE VISIT (OUTPATIENT)
Dept: FAMILY MEDICINE CLINIC | Age: 81
End: 2021-09-22
Payer: MEDICARE

## 2021-09-22 VITALS
TEMPERATURE: 97.3 F | OXYGEN SATURATION: 99 % | SYSTOLIC BLOOD PRESSURE: 154 MMHG | HEART RATE: 56 BPM | DIASTOLIC BLOOD PRESSURE: 78 MMHG | BODY MASS INDEX: 29.12 KG/M2 | HEIGHT: 71 IN | WEIGHT: 208 LBS

## 2021-09-22 DIAGNOSIS — M25.511 CHRONIC RIGHT SHOULDER PAIN: ICD-10-CM

## 2021-09-22 DIAGNOSIS — G89.29 CHRONIC RIGHT SHOULDER PAIN: ICD-10-CM

## 2021-09-22 DIAGNOSIS — I48.91 ATRIAL FIBRILLATION, UNSPECIFIED TYPE (HCC): ICD-10-CM

## 2021-09-22 DIAGNOSIS — E78.00 HIGH CHOLESTEROL: Primary | ICD-10-CM

## 2021-09-22 PROCEDURE — G8753 SYS BP > OR = 140: HCPCS | Performed by: FAMILY MEDICINE

## 2021-09-22 PROCEDURE — 99213 OFFICE O/P EST LOW 20 MIN: CPT | Performed by: FAMILY MEDICINE

## 2021-09-22 PROCEDURE — G8754 DIAS BP LESS 90: HCPCS | Performed by: FAMILY MEDICINE

## 2021-09-22 PROCEDURE — 73030 X-RAY EXAM OF SHOULDER: CPT

## 2021-09-22 PROCEDURE — G8419 CALC BMI OUT NRM PARAM NOF/U: HCPCS | Performed by: FAMILY MEDICINE

## 2021-09-22 PROCEDURE — G8536 NO DOC ELDER MAL SCRN: HCPCS | Performed by: FAMILY MEDICINE

## 2021-09-22 PROCEDURE — G8510 SCR DEP NEG, NO PLAN REQD: HCPCS | Performed by: FAMILY MEDICINE

## 2021-09-22 PROCEDURE — G8427 DOCREV CUR MEDS BY ELIG CLIN: HCPCS | Performed by: FAMILY MEDICINE

## 2021-09-22 PROCEDURE — 1101F PT FALLS ASSESS-DOCD LE1/YR: CPT | Performed by: FAMILY MEDICINE

## 2021-09-22 NOTE — PATIENT INSTRUCTIONS
General Health and concerns:  HEART HEALTHY DIET:  A heart healthy diet is one that is low in cholesterol (less than 300 mg daily), fat (less than 80 g daily) . You should also minimize carbohydrates / sugars (less amounts of breads, pastas, potato and potato products and sugary foods/snacks, cookies, cakes, etc) . Try to eat whole wheat/multigrain breads and pastas and eat more vegetables. Cook with olive oil (or no oil) and grill, bake, broil or boil foods. Less red meat and more chicken , fish and lean cuts of beef (limited). [id number] calories per day is sufficient [id number] is acceptable for weight loss. EXERCISE:  You should do exercise 3-5 days per week (minimum) to include increasing your heart rate for 30 to 45 minutes. At least a pace of a brisk walk should do that. This build up your heart and lung endurance and muscles and helps many function of the body. OTHER:    IF your condition(s) do not improve, get worse and/or if any concerns arise, please call or come by the office. Routine Health maintenance: You need to get a yearly follow up/physical exam to review, discuss age and gender appropriate exams, labs, vaccines and screening tests. This includes cardiovascular health risk, cancer screens and other gus related topics. Medications-Take all medications as directed. Please do not stop unless you talk to your doctor or health care provider first. Report any problems immediately. PLEASE CHECK THE LIST FROM TODAY's VISIT and make SURE IT IS ACCURATE-Please bring any issues to our concern IMMEDIATELY!! Referrals: if you have been given a referral, please call the office if you do not hear from provider in one week. You may make the appointment yourself. Please keep all appointments with specialists and ask them to send their notes, thoughts, recommendations to us , as your PCP.     KEEP all upcoming appointments with our office UNLESS otherwise and specifically told not to. CHECK your diagnosis/problem list for today and that orders and prescriptions are what we discussed as well as MAKE sure all information is accurate and has been discussed to your satisfaction. PLEASE make sure all your questions have been answered and feel free to call or come back should any concerns arise. Imaging/Labs:  Be sure to get these images in a timely manner. IF your test must be scheduled, let us know if you need help getting this done and if you do not hear from that provider in a week , call us or them. BE SURE to call the office if you do not hear regarding the results in one week after the test is performed Image or lab). It is our intention to inform you of the results ALWAYS, even if normal you should get a notification (Call, portal message). PLEASE amara if you do not get the results. PLEASE follow all recommendations and call/come in /ask questions if you do not understand of if problems develop after or in between visits. Failure to comply with recommended health care advise could result in serious health consequences. Thank you for choosing our practice and please let us know how we can help you feel better and stay well!

## 2021-09-22 NOTE — PROGRESS NOTES
1. Have you been to the ER, urgent care clinic since your last visit? Hospitalized since your last visit? No    2. Have you seen or consulted any other health care providers outside of the [address] [place] [address] since your last visit? Include any pap smears or colon screening. Dr. Mariaa Wheatley (Cardio)     Chief Complaint   Patient presents with    Cholesterol Problem    Hypertension    Irregular Heart Beat    Shoulder Pain     c/o right shoulder pain. Is not able to lift arm up past a certain point. Pain moves into collar bone.        Visit Vitals  BP (!) 154/91 (BP 1 Location: Left upper arm, BP Patient Position: Sitting)   Pulse 60   Temp 97.3 °F (36.3 °C) (Temporal)   Ht 5' 11\" (1.803 m)   Wt 208 lb (94.3 kg)   SpO2 99%   BMI 29.01 kg/m²

## 2021-09-22 NOTE — PROGRESS NOTES
*ATTENTION:  This note has been created by a medical student for educational purposes only. Please do not refer to the content of this note for clinical decision-making, billing, or other purposes. Please see attending physicians note to obtain clinical information on this patient. *     IDENTIFYING INFORMATION:      Merle Kim , 78 y.o., male  [address] [address]     Medical Record Number: [id number]          CHIEF COMPLAINT:     Chief Complaint   Patient presents with    Cholesterol Problem    Hypertension    Irregular Heart Beat    Shoulder Pain     c/o right shoulder pain. Is not able to lift arm up past a certain point. Pain moves into collar bone. HISTORY OF PRESENT ILLNESS:    Merle Kim is a 78 y.o. male  has a past medical history of Arrhythmia (9/3/14), Cancer ([facility] [address] Decreased hearing (8/21/2020), Ear problem (8/21/2020), High cholesterol (8/21/2020), HTN (hypertension) (8/21/2020), Hypertension, Other ill-defined conditions(799.89), and Other ill-defined conditions(799.89). .  he comes in for follow-up visit. History of hypertension, home blood pressures averaging around mid-130s/mid70s. Occasionally, wife notes that he sounds like he is breathing hard, but he does not feel short of breath. He has some bilateral ankle swelling, but this improves with rest. No chest pain, palpations, orthopnea, or PND. History of atrial fibrillation. No blood thinners. No chest pain or palpitations. No dizziness or syncope. History of bilateral sensorineural hearing loss. Follows with Dr. Denton Epstein (ENT), last seen on 8/16/2021 for right otalgia. Hearing aid in the right ear, but would like to have hearing aids in both ears. No imbalance or ear pain. History of osteoarthritis s/p hip and knee replacement 5+ years ago. He occasionally uses a walking stick, but is usually able to move around without any problems. Notes that he frequently does yardwork at home.  C/o worsening right shoulder pain for a few months, especially when picking things up, but not when pushing down. Radiates across to chest where he has a mole. (Seen by dermatologist 4 months ago, due next month). Pain also occasionally radiates up the right neck with associated mild headaches and right arm numbness. Moderate relief with half tab of Aleve and topical aspercreme. No focal weakness. No recent injury. PAST MEDICAL HISTORY:     Past Medical History:   Diagnosis Date    Arrhythmia 9/3/14    RECENT DX OF ATRIAL FIBRILLATION    Cancer ([facility] [address]     PROSTATE.  Decreased hearing 8/21/2020    Ear problem 8/21/2020    High cholesterol 8/21/2020    HTN (hypertension) 8/21/2020    Hypertension     CONTROLLED BY MEDS.  Other ill-defined conditions(799.89)     Other ill-defined conditions(799.89)     hypercholesterolemia       MEDICATIONS:     Current Outpatient Medications on File Prior to Visit   Medication Sig Dispense Refill    amLODIPine (NORVASC) 5 mg tablet TAKE 1 TABLET BY MOUTH EVERY DAY 90 Tab 4    lisinopriL (PRINIVIL, ZESTRIL) 40 mg tablet TAKE 1 TABLET BY MOUTH EVERY DAY BEFORE A MEAL 90 Tab 4    amiodarone (CORDARONE) 200 mg tablet TAKE 1/2 TABLET BY MOUTH DAILY      atorvastatin (LIPITOR) 10 mg tablet TAKE 1 TABLET BY MOUTH EVERY DAY      Klor-Con M20 20 mEq tablet TAKE 1 TABLET BY MOUTH EVERY DAY 90 Tab 3    carvedilol (COREG) 12.5 mg tablet Take 12.5 mg by mouth two (2) times daily (with meals). Takes in am & pm      Hydrochlorothiazide 12.5 mg tablet Take 12.5 mg by mouth daily.  oxyCODONE IR (ROXICODONE) 5 mg immediate release tablet Take 1-2 tablets by mouth every four (4) hours as needed for Pain. (Patient not taking: Reported on 8/16/2021) 80 tablet 0    rivaroxaban (XARELTO) 20 mg tab tablet Take 20 mg by mouth daily (with dinner). (Patient not taking: Reported on 8/16/2021)      simvastatin (ZOCOR) 20 mg tablet Take 20 mg by mouth nightly.  (Patient not taking: Reported on 8/16/2021)       No current facility-administered medications on file prior to visit. ALLERGIES:    No Known Allergies      SOCIAL HISTORY:     Social History     Tobacco Use    Smoking status: Never Smoker    Smokeless tobacco: Never Used   Substance Use Topics    Alcohol use: Yes     Alcohol/week: 0.8 standard drinks     Types: 1 Cans of beer per week     Comment: 1-2 per month    Drug use: No       SURGICAL HISTORY:    Past Surgical History:   Procedure Laterality Date    HX APPENDECTOMY      HX CATARACT REMOVAL      both eyes    HX HEENT      repair of left eye detached retina    HX ORTHOPAEDIC      left knee replacement    HX PROSTATECTOMY  5/8/02        FAMILY HISTORY:    Family History   Problem Relation Age of Onset    Heart Disease Mother     Cancer Mother         NOT SURE. GYN ISSUES.  Stroke Father          REVIEW OF SYSTEMS:    I personally collected this information from all available source present (patient/others in room and records available) [facility]    Review of Systems   Constitutional: Negative for chills, diaphoresis, fever, malaise/fatigue and weight loss. HENT: Positive for ear pain (mildly occasionally), hearing loss (bilaterally, right hearing aid) and tinnitus. Negative for congestion, ear discharge, sinus pain and sore throat. H/o bilateral sensorineural hearing loss   Eyes: Negative for blurred vision, double vision and photophobia. Respiratory: Negative for cough, shortness of breath and wheezing. Cardiovascular: Negative for chest pain, palpitations, orthopnea and leg swelling. Gastrointestinal: Negative for abdominal pain, blood in stool, constipation, diarrhea, heartburn, melena, nausea and vomiting. Genitourinary: Positive for frequency and urgency. Negative for dysuria, flank pain and hematuria. S/p total prostatectomy   Musculoskeletal: Positive for joint pain, myalgias and neck pain. Negative for back pain.         See HPI   Skin: Negative for itching and rash. Neurological: Negative for dizziness, tingling, sensory change, focal weakness, weakness and headaches. Endo/Heme/Allergies: Negative for polydipsia. Does not bruise/bleed easily. Psychiatric/Behavioral: Negative for depression, memory loss and suicidal ideas. The patient has insomnia (wake up 3-4x per night, usually to use the restroom). The patient is not nervous/anxious. PHYSICAL EXAMINATION:    Vital Signs:    Visit Vitals  BP (!) 154/78 (BP 1 Location: Left upper arm, BP Patient Position: Sitting)   Pulse (!) 56   Temp 97.3 °F (36.3 °C) (Temporal)   Ht 5' 11\" (1.803 m)   Wt 208 lb (94.3 kg)   SpO2 99%   BMI 29.01 kg/m²         Wt Readings from Last 3 Encounters:   09/22/21 208 lb (94.3 kg)   08/16/21 210 lb (95.3 kg)   03/22/21 213 lb (96.6 kg)     BP Readings from Last 3 Encounters:   09/22/21 (!) 154/78   08/16/21 (!) 148/90   03/22/21 139/73         Physical Exam  Vitals and nursing note reviewed. Constitutional:       General: He is not in acute distress. Appearance: Normal appearance. He is not toxic-appearing. HENT:      Head: Normocephalic and atraumatic. Right Ear: Tympanic membrane, ear canal and external ear normal.      Left Ear: Tympanic membrane, ear canal and external ear normal.      Nose: Nose normal.      Mouth/Throat:      Mouth: Mucous membranes are moist.      Pharynx: Oropharynx is clear. No oropharyngeal exudate or posterior oropharyngeal erythema. Eyes:      General: No scleral icterus. Extraocular Movements: Extraocular movements intact. Conjunctiva/sclera: Conjunctivae normal.      Pupils: Pupils are equal, round, and reactive to light. Neck:      Vascular: No carotid bruit. Cardiovascular:      Rate and Rhythm: Normal rate and regular rhythm. Pulses: Normal pulses. Heart sounds: Normal heart sounds. No murmur heard. No friction rub. No gallop.     Pulmonary:      Effort: Pulmonary effort is normal. Breath sounds: Normal breath sounds. No wheezing, rhonchi or rales. Abdominal:      General: Abdomen is flat. Bowel sounds are normal. There is no distension. Palpations: Abdomen is soft. There is no mass. Tenderness: There is no abdominal tenderness. There is no right CVA tenderness, left CVA tenderness, guarding or rebound. Hernia: No hernia is present. Musculoskeletal:         General: Tenderness (right trapezius (mid-belly), right AC joint) present. No swelling, deformity or signs of injury. Right shoulder: Decreased range of motion (abduction, adduction, external rotation). Cervical back: Normal range of motion and neck supple. Lymphadenopathy:      Cervical: No cervical adenopathy. Skin:     General: Skin is warm and dry. Coloration: Skin is not jaundiced or pale. Findings: No bruising, erythema, lesion or rash. Neurological:      General: No focal deficit present. Mental Status: He is alert and oriented to person, place, and time. Cranial Nerves: No cranial nerve deficit. Sensory: No sensory deficit. Psychiatric:         Mood and Affect: Mood normal.         Behavior: Behavior normal.         Thought Content: Thought content normal.         Judgment: Judgment normal.           ASSESSMENT/PLAN:      Chente Anglin is a 78 y.o. male who presents for follow-up of hypertension, osteoarthritis, hyperlipidemia, and atrial fibrillation.  Hypertension  o Stable, home blood pressures around 130s/70s. o Continue medications as directed.  o Will follow-up in 6 months.  Right shoulder pain  o Ongoing for several months. On physical exam, there is point tenderness over right trapezius (mid-belly) and in right AC joint. Active and passive abduction, adduction, and external rotation limited secondary to pain and occasional numbness  o Will order right shoulder XR to check for occult fracture or ligamentous injury.  Pending results, patient may benefit from corticosteroid injections  o Will follow-up with results and at next visit.  Osteoarthritis of hip and knee  o S/p hip and knee replacements. Stable. He occasionally uses a walking stick, but otherwise has good mobility. o Will follow-up in 6 months      The patient actively participated in medical decision making. FOLLOW UP:     Patient knows to keep any and all future visits scheduled unless told otherwise. Patient knows to call, come back if any concerns, questions, comments or problems arise. Follow-up and Dispositions    · Return in about 6 months (around 3/22/2022) for follow up htn, lipids, medicare wellness. Billy Smith    This visit was reviewed and signed electronically. It was been completed with voice recognition software and hand typing. It may have syntax and spelling errors despite editing.

## 2021-09-22 NOTE — PROGRESS NOTES
IDENTIFYING INFORMATION:      Emigdio Mcintyre , 78 y.o., male  [facility]  [facility]     Medical Record Number: [id number]          CHIEF COMPLAINT:     Chief Complaint   Patient presents with    Cholesterol Problem    Hypertension    Irregular Heart Beat    Shoulder Pain     c/o right shoulder pain. Is not able to lift arm up past a certain point. Pain moves into collar bone. HISTORY OF PRESENT ILLNESS:    Emigdio Mcintyre is a 78 y.o. male  has a past medical history of Arrhythmia (9/3/14), Cancer ([facility] [address] Decreased hearing (8/21/2020), Ear problem (8/21/2020), High cholesterol (8/21/2020), HTN (hypertension) (8/21/2020), Hypertension, Other ill-defined conditions(799.89), and Other ill-defined conditions(799.89). .  he comes in for 6 month follow up and still having shoulder pain. Right side. Chronic. Hurts to use and move. Sometimes at night also. NO numbness, burning tingling or weakness. Has hearing loss, ENT and their AUDIO says he only needs one HA and he feels funny with just one. Seeing another audio at his earliest convenience. Wants to get his shoulder addressed right now. Has Prostate cancer and urine frequency, not severe, 3-4 times per night. Has     PAST MEDICAL HISTORY:     Past Medical History:   Diagnosis Date    Arrhythmia 9/3/14    RECENT DX OF ATRIAL FIBRILLATION    [facility]     PROSTATE.  Decreased hearing 8/21/2020    Ear problem 8/21/2020    High cholesterol 8/21/2020    HTN (hypertension) 8/21/2020    Hypertension     CONTROLLED BY MEDS.     Other ill-defined conditions(799.89)     Other ill-defined conditions(799.89)     hypercholesterolemia       MEDICATIONS:     Current Outpatient Medications on File Prior to Visit   Medication Sig Dispense Refill    amLODIPine (NORVASC) 5 mg tablet TAKE 1 TABLET BY MOUTH EVERY DAY 90 Tab 4    lisinopriL (PRINIVIL, ZESTRIL) 40 mg tablet TAKE 1 TABLET BY MOUTH EVERY DAY BEFORE A MEAL 90 Tab 4    amiodarone (CORDARONE) 200 mg tablet TAKE 1/2 TABLET BY MOUTH DAILY      atorvastatin (LIPITOR) 10 mg tablet TAKE 1 TABLET BY MOUTH EVERY DAY      Klor-Con M20 20 mEq tablet TAKE 1 TABLET BY MOUTH EVERY DAY 90 Tab 3    carvedilol (COREG) 12.5 mg tablet Take 12.5 mg by mouth two (2) times daily (with meals). Takes in am & pm      Hydrochlorothiazide 12.5 mg tablet Take 12.5 mg by mouth daily.  oxyCODONE IR (ROXICODONE) 5 mg immediate release tablet Take 1-2 tablets by mouth every four (4) hours as needed for Pain. (Patient not taking: Reported on 8/16/2021) 80 tablet 0    rivaroxaban (XARELTO) 20 mg tab tablet Take 20 mg by mouth daily (with dinner). (Patient not taking: Reported on 8/16/2021)      simvastatin (ZOCOR) 20 mg tablet Take 20 mg by mouth nightly. (Patient not taking: Reported on 8/16/2021)       No current facility-administered medications on file prior to visit. ALLERGIES:    No Known Allergies      SOCIAL HISTORY:     Social History     Tobacco Use    Smoking status: Never Smoker    Smokeless tobacco: Never Used   Substance Use Topics    Alcohol use: Yes     Alcohol/week: 0.8 standard drinks     Types: 1 Cans of beer per week     Comment: 1-2 per month    Drug use: No       SURGICAL HISTORY:    Past Surgical History:   Procedure Laterality Date    HX APPENDECTOMY      HX CATARACT REMOVAL      both eyes    HX HEENT      repair of left eye detached retina    HX ORTHOPAEDIC      left knee replacement    HX PROSTATECTOMY  5/8/02        FAMILY HISTORY:    Family History   Problem Relation Age of Onset    Heart Disease Mother     Cancer Mother         NOT SURE. GYN ISSUES.  Stroke Father          REVIEW OF SYSTEMS:    I personally collected this information from all available source present (patient/others in room and records available) -[email or web address]    Review of Systems   Constitutional: Negative for chills, diaphoresis and fever. HENT: Positive for hearing loss.  Negative for tinnitus. Respiratory: Negative for cough, shortness of breath and wheezing. Cardiovascular: Negative for chest pain, palpitations and leg swelling. Gastrointestinal: Negative for abdominal pain, blood in stool, constipation, diarrhea and nausea. Musculoskeletal: Positive for joint pain. Negative for back pain, myalgias and neck pain. Neurological: Negative for dizziness, tingling, sensory change, weakness and headaches. PHYSICAL EXAMINATION:    Vital Signs:    Visit Vitals  BP (!) 154/78 (BP 1 Location: Left upper arm, BP Patient Position: Sitting)   Pulse (!) 56   Temp 97.3 °F (36.3 °C) (Temporal)   Ht 5' 11\" (1.803 m)   Wt 208 lb (94.3 kg)   SpO2 99%   BMI 29.01 kg/m²         Wt Readings from Last 3 Encounters:   09/22/21 208 lb (94.3 kg)   08/16/21 210 lb (95.3 kg)   03/22/21 213 lb (96.6 kg)     BP Readings from Last 3 Encounters:   09/22/21 (!) 154/78   08/16/21 (!) 148/90   03/22/21 139/73         Physical Exam  Constitutional:       Appearance: He is not ill-appearing or toxic-appearing. Eyes:      General: No scleral icterus. Extraocular Movements: Extraocular movements intact. Conjunctiva/sclera: Conjunctivae normal.   Cardiovascular:      Rate and Rhythm: Normal rate and regular rhythm. Heart sounds: No murmur heard. No friction rub. No gallop. Pulmonary:      Effort: Pulmonary effort is normal.      Breath sounds: Normal breath sounds. No wheezing, rhonchi or rales. Abdominal:      Palpations: Abdomen is soft. Tenderness: There is no abdominal tenderness. There is no guarding. Musculoskeletal:         General: Tenderness (Right shoulder is tender to palpation mid trapezius belly right AC and decreased/painful elevation internal and external rotation. Compared to left which is almost normal.) and deformity (Arthritic hands and knees.) present. Cervical back: No tenderness. Right lower leg: No edema. Left lower leg: No edema. Lymphadenopathy:      Cervical: No cervical adenopathy. Skin:     Coloration: Skin is not jaundiced. Findings: No erythema or rash. Neurological:      General: No focal deficit present. Mental Status: He is alert and oriented to person, place, and time. Mental status is at baseline. Sensory: No sensory deficit. Motor: No weakness. Psychiatric:         Mood and Affect: Mood normal.         Behavior: Behavior normal.         Thought Content: Thought content normal.         Judgment: Judgment normal.           ASSESSMENT/PLAN:       Check x-ray right shoulder. ICD-10-CM ICD-9-CM    1. High cholesterol  E78.00 272.0    2. Atrial fibrillation, unspecified type ([facility] [address]  I48.91 427.31    3. Chronic right shoulder pain  M25.511 719.41 XR SHOULDER RT AP/LAT MIN 2 V    G89.29 338.29      Discussion (regarding today's visit with Cale Reed);   WE reviewed medications, treatment, testing such as labs, imagine, referrals and when to call regarding results and appointments.  Reminded patient to keep any and all appointments with specialists, labs, imaging.  Reminded patient to make sure we get copies of any specialists care, labs and imaging.  Reminded patient to call of come by the office if there are any concerns, questions , comments or problems.  The patient verbalized understanding of the care plan and all questions were answered to the patient's satisfaction prior to leaving the office.  The patient was told that failure to comply with recommended testing could result in abnormal health consequences.  The patient was instructed to have yearly routine health maintenance including but not limited to age appropriate vaccines, testing, screening exams.  ALL questions were answered to his satisfaction before leaving the office. The patient actively participated in medical decision making.         FOLLOW UP:     Patient knows to keep any and all future visits scheduled unless told otherwise. Patient knows to call, come back if any concerns, questions, comments or problems arise. Follow-up and Dispositions    · Return in about 6 months (around 3/22/2022) for follow up htn, lipids, medicare wellness. Regina Barrera DO    This visit was reviewed and signed electronically. It was been completed with voice recognition software and hand typing. It may have syntax and spelling errors despite editing.

## 2021-12-17 ENCOUNTER — OFFICE VISIT (OUTPATIENT)
Dept: ORTHOPEDIC SURGERY | Age: 81
End: 2021-12-17
Payer: MEDICARE

## 2021-12-17 VITALS — HEIGHT: 71 IN | WEIGHT: 205 LBS | BODY MASS INDEX: 28.7 KG/M2

## 2021-12-17 DIAGNOSIS — M19.011 OSTEOARTHRITIS OF RIGHT SHOULDER, UNSPECIFIED OSTEOARTHRITIS TYPE: Primary | ICD-10-CM

## 2021-12-17 DIAGNOSIS — M75.111 INCOMPLETE ROTATOR CUFF TEAR OR RUPTURE OF RIGHT SHOULDER, NOT SPECIFIED AS TRAUMATIC: ICD-10-CM

## 2021-12-17 PROCEDURE — 20610 DRAIN/INJ JOINT/BURSA W/O US: CPT | Performed by: ORTHOPAEDIC SURGERY

## 2021-12-17 PROCEDURE — 99204 OFFICE O/P NEW MOD 45 MIN: CPT | Performed by: ORTHOPAEDIC SURGERY

## 2021-12-17 PROCEDURE — G8432 DEP SCR NOT DOC, RNG: HCPCS | Performed by: ORTHOPAEDIC SURGERY

## 2021-12-17 PROCEDURE — G8427 DOCREV CUR MEDS BY ELIG CLIN: HCPCS | Performed by: ORTHOPAEDIC SURGERY

## 2021-12-17 PROCEDURE — G8536 NO DOC ELDER MAL SCRN: HCPCS | Performed by: ORTHOPAEDIC SURGERY

## 2021-12-17 PROCEDURE — G8419 CALC BMI OUT NRM PARAM NOF/U: HCPCS | Performed by: ORTHOPAEDIC SURGERY

## 2021-12-17 PROCEDURE — 1101F PT FALLS ASSESS-DOCD LE1/YR: CPT | Performed by: ORTHOPAEDIC SURGERY

## 2021-12-17 PROCEDURE — G8756 NO BP MEASURE DOC: HCPCS | Performed by: ORTHOPAEDIC SURGERY

## 2021-12-17 RX ORDER — LIDOCAINE HYDROCHLORIDE 10 MG/ML
1 INJECTION INFILTRATION; PERINEURAL ONCE
Status: COMPLETED | OUTPATIENT
Start: 2021-12-17 | End: 2021-12-17

## 2021-12-17 RX ORDER — METHYLPREDNISOLONE ACETATE 40 MG/ML
80 INJECTION, SUSPENSION INTRA-ARTICULAR; INTRALESIONAL; INTRAMUSCULAR; SOFT TISSUE ONCE
Status: COMPLETED | OUTPATIENT
Start: 2021-12-17 | End: 2021-12-17

## 2021-12-17 RX ORDER — MELOXICAM 7.5 MG/1
7.5 TABLET ORAL DAILY
Qty: 30 TABLET | Refills: 3 | Status: SHIPPED | OUTPATIENT
Start: 2021-12-17 | End: 2022-01-26

## 2021-12-17 RX ORDER — BUPIVACAINE HYDROCHLORIDE 2.5 MG/ML
1 INJECTION, SOLUTION INFILTRATION; PERINEURAL ONCE
Status: COMPLETED | OUTPATIENT
Start: 2021-12-17 | End: 2021-12-17

## 2021-12-17 RX ADMIN — LIDOCAINE HYDROCHLORIDE 1 ML: 10 INJECTION INFILTRATION; PERINEURAL at 14:27

## 2021-12-17 RX ADMIN — METHYLPREDNISOLONE ACETATE 80 MG: 40 INJECTION, SUSPENSION INTRA-ARTICULAR; INTRALESIONAL; INTRAMUSCULAR; SOFT TISSUE at 14:27

## 2021-12-17 RX ADMIN — BUPIVACAINE HYDROCHLORIDE 2.5 MG: 2.5 INJECTION, SOLUTION INFILTRATION; PERINEURAL at 14:26

## 2021-12-17 NOTE — PROGRESS NOTES
I reviewed the note and agree with their assessment and plan. ASSESSMENT/PLAN:  Below is the assessment and plan developed based on review of pertinent history, physical exam, labs, studies, and medications. 1. Osteoarthritis of right shoulder, unspecified osteoarthritis type  2. Incomplete rotator cuff tear or rupture of right shoulder, not specified as traumatic      Return in about 4 weeks (around 2022). We discussed with the patient that that he does have a little bit of arthritis in the shoulder but in addition his he probably does have a degenerative rotator cuff tear. He is not interested in any type of surgical intervention so we will hold off on MRI at this time. He is not interested in any physical therapy either. We will get him a prescription for Mobic to take once daily to try to reduce his symptoms. He also was asking for a steroid injection into the shoulder today which we feel is appropriate for his age and his goals. We will see him back in 4 to 6 weeks time for reevaluation. If he is not doing any better we can discuss an MRI of the right shoulder. Right shoulder was prepped and from a posterior position the subacromial space was injected with 1 cc of 1% lidocaine, 1 cc of 0.25% bupivacaine, and 2 cc of 40 mg Depo-Medrol. The patient tolerated the injection well without any complication. SUBJECTIVE/OBJECTIVE:  Keshawn Ann (: 1940) is a 78 y.o. male, patient,here for evaluation of the Shoulder Pain (right)  . Patient presents today for evaluation of his right shoulder. He states that he has had symptoms for the last year without any known injury. He points to the anterior portion of the shoulder as the location of his pain. He states that he does have pain at night that wakes him up if he rolls onto that side. He denies any pain that goes down past the elbow and denies any numbness or tingling in the arm.   He is right-hand dominant and tries to stay active. He describes the pain is moderate to severe at times. He does take some anti-inflammatories which were not that helpful. Physical Exam    General: Well-dressed well-nourished male in no acute distress, alert and oriented x3  Right shoulder: No tenderness palpation over the right shoulder. Forward flexion to 100 degrees, abduction to 100 degrees. External rotation to 50 degrees and internal rotation to L5.  5/5 strength with full can empty can testing, 4/5 strength with external rotation and 5/5 strength with belly press test.  Does have some positive impingement test with Darnelle Band and Neer's. Positive speeds and Yergason's test as well. He does have mild crepitus with passive range of motion of the shoulder especially with external rotation. Sensation is intact to light touch over the axillary nerve distribution of his shoulder and distally in the hand. 2+ radial pulse    Imaging:    3 views of the right shoulder were reviewed from an outside facility and demonstrate moderate degenerative changes of the glenohumeral joint with subchondral sclerosis. He does have some degenerative changes of the acromioclavicular joint as well. Humeral head is well-seated within the glenoid. There are no fractures or dislocations. No Known Allergies    Current Outpatient Medications   Medication Sig    amLODIPine (NORVASC) 5 mg tablet TAKE 1 TABLET BY MOUTH EVERY DAY    lisinopriL (PRINIVIL, ZESTRIL) 40 mg tablet TAKE 1 TABLET BY MOUTH EVERY DAY BEFORE A MEAL    amiodarone (CORDARONE) 200 mg tablet TAKE 1/2 TABLET BY MOUTH DAILY    atorvastatin (LIPITOR) 10 mg tablet TAKE 1 TABLET BY MOUTH EVERY DAY    carvedilol (COREG) 12.5 mg tablet Take 12.5 mg by mouth two (2) times daily (with meals). Takes in am & pm    Hydrochlorothiazide 12.5 mg tablet Take 12.5 mg by mouth daily. No current facility-administered medications for this visit.        Past Medical History:   Diagnosis Date    Arrhythmia 9/3/14 RECENT DX OF ATRIAL FIBRILLATION    Cancer ([facility] [address]     PROSTATE.  Decreased hearing 8/21/2020    Ear problem 8/21/2020    High cholesterol 8/21/2020    HTN (hypertension) 8/21/2020    Hypertension     CONTROLLED BY MEDS.  Other ill-defined conditions(799.89)     Other ill-defined conditions(799.89)     hypercholesterolemia       Past Surgical History:   Procedure Laterality Date    HX APPENDECTOMY      HX CATARACT REMOVAL      both eyes    HX HEENT      repair of left eye detached retina    HX ORTHOPAEDIC      left knee replacement    HX PROSTATECTOMY  5/8/02       Family History   Problem Relation Age of Onset    Heart Disease Mother     Cancer Mother         NOT SURE. GYN ISSUES.  Stroke Father        Social History     Socioeconomic History    Marital status:      Spouse name: Not on file    Number of children: Not on file    Years of education: Not on file    Highest education level: Not on file   Occupational History    Not on file   Tobacco Use    Smoking status: Never Smoker    Smokeless tobacco: Never Used   Substance and Sexual Activity    Alcohol use: Yes     Alcohol/week: 0.8 standard drinks     Types: 1 Cans of beer per week     Comment: 1-2 per month    Drug use: No    Sexual activity: Yes     Partners: Female   Other Topics Concern    Not on file   Social History Narrative    Not on file     Social Determinants of Health     Financial Resource Strain:     Difficulty of Paying Living Expenses: Not on file   Food Insecurity:     Worried About Running Out of Food in the Last Year: Not on file    [place] of Food in the Last Year: Not on file   Transportation Needs:     Lack of Transportation (Medical): Not on file    Lack of Transportation (Non-Medical):  Not on file   Physical Activity:     Days of Exercise per Week: Not on file    Minutes of Exercise per Session: Not on file   Stress:     Feeling of Stress : Not on file   Social Connections:     Frequency of Communication with Friends and Family: Not on file    Frequency of Social Gatherings with Friends and Family: Not on file    Attends Mosque Services: Not on file    Active Member of Clubs or Organizations: Not on file    Attends Club or Organization Meetings: Not on file    Marital Status: Not on file   Intimate Partner Violence:     Fear of Current or Ex-Partner: Not on file    Emotionally Abused: Not on file    Physically Abused: Not on file    Sexually Abused: Not on file   Housing Stability:     Unable to Pay for Housing in the Last Year: Not on file    Number of [place] in the Last Year: Not on file    Unstable Housing in the Last Year: Not on file       Review of Systems    Pain Assessment  12/17/2021   Location of Pain Shoulder   Location Modifiers Right   Severity of Pain 7   Quality of Pain Aching;Dull   Duration of Pain Persistent   Frequency of Pain Constant       Vitals:  Ht 5' 11\" (1.803 m)   Wt 205 lb (93 kg)   BMI 28.59 kg/m²    Body mass index is 28.59 kg/m². ROS     Positive for: Musculoskeletal    Last edited by Madeline Ibarra on 12/17/2021  1:32 PM. (History)            An electronic signature was used to authenticate this note.   -- Ryan Arzola DO

## 2022-01-03 RX ORDER — POTASSIUM CHLORIDE 1500 MG/1
TABLET, EXTENDED RELEASE ORAL
Qty: 90 TABLET | Refills: 3 | Status: SHIPPED | OUTPATIENT
Start: 2022-01-03

## 2022-01-25 PROBLEM — M19.011 OSTEOARTHRITIS OF RIGHT SHOULDER: Status: ACTIVE | Noted: 2022-01-25

## 2022-01-26 ENCOUNTER — OFFICE VISIT (OUTPATIENT)
Dept: ORTHOPEDIC SURGERY | Age: 82
End: 2022-01-26
Payer: MEDICARE

## 2022-01-26 VITALS — WEIGHT: 205 LBS | HEIGHT: 71 IN | BODY MASS INDEX: 28.7 KG/M2

## 2022-01-26 DIAGNOSIS — M75.101 ROTATOR CUFF TEAR, NON-TRAUMATIC, RIGHT: ICD-10-CM

## 2022-01-26 DIAGNOSIS — M19.011 OSTEOARTHRITIS OF RIGHT SHOULDER, UNSPECIFIED OSTEOARTHRITIS TYPE: Primary | ICD-10-CM

## 2022-01-26 PROCEDURE — G8427 DOCREV CUR MEDS BY ELIG CLIN: HCPCS | Performed by: ORTHOPAEDIC SURGERY

## 2022-01-26 PROCEDURE — 1101F PT FALLS ASSESS-DOCD LE1/YR: CPT | Performed by: ORTHOPAEDIC SURGERY

## 2022-01-26 PROCEDURE — G8432 DEP SCR NOT DOC, RNG: HCPCS | Performed by: ORTHOPAEDIC SURGERY

## 2022-01-26 PROCEDURE — G8756 NO BP MEASURE DOC: HCPCS | Performed by: ORTHOPAEDIC SURGERY

## 2022-01-26 PROCEDURE — G8419 CALC BMI OUT NRM PARAM NOF/U: HCPCS | Performed by: ORTHOPAEDIC SURGERY

## 2022-01-26 PROCEDURE — G8536 NO DOC ELDER MAL SCRN: HCPCS | Performed by: ORTHOPAEDIC SURGERY

## 2022-01-26 PROCEDURE — 99214 OFFICE O/P EST MOD 30 MIN: CPT | Performed by: ORTHOPAEDIC SURGERY

## 2022-01-26 NOTE — PROGRESS NOTES
I reviewed the note and agree with their assessment and plan. ASSESSMENT/PLAN:  Below is the assessment and plan developed based on review of pertinent history, physical exam, labs, studies, and medications. 1. Osteoarthritis of right shoulder, unspecified osteoarthritis type  2. Incomplete rotator cuff tear or rupture of right shoulder, not specified as traumatic      In discussion with the patient, we considered the numerus possible diagnoses that could be contributing to their present symptoms. We also deliberated on the extensive management options that must be considered to treat their current condition. We reviewed their accessible prior medical records, diagnostic tests, and current health and employment information. We considered how these symptoms were affecting the patient´s activities of daily living as well as employment and fitness activities. The patient had various questions regarding the possible risks, benefits, complications, morbidity and mortality regarding their diagnosis and treatment options. The patients´ comorbidities were considered, and I advocated that they consider maximizing lifestyle modification through nutrition and exercise to aid in addressing their symptoms. Shared decision making yielded an understanding to move forward with conservation treatment preferences. The patient expressed understanding that if conservative management fails to alleviate the present symptoms they will return to office for re-evaluation and consideration of additional diagnostic tests and potential surgical options. In the interim, we have recommended ice, elevation,  and anti-inflammatory medications along with a physician directed home exercise program. We discussed the risks and common side effects of anti-inflamatory medications and instructed the patient to discontinue the medication and contact us if they experienced any side effects.  The patient was encouraged to discuss the possible side effects with their family physician or pharmacist prior to initiating any new medications. Given that the patient's symptoms are increasing in frequency and duration, we have decided to evaluate the etiology of the pain and loss of function with an MRI. We discussed the risks of an MRI which include, but are not limited to the enclosed space, noisy environment, magnetic effect on implanted metal. We also talked about the fact that MRI is also contraindicated in the presence of internal metallic objects such as bullets or shrapnel, as well as surgical clips, pins, plates, screws, metal sutures, or wire mesh. We talked about the fact that MRI does not use radiation, but it may be contrindicated if the patient has implanted pacemakers, intracranial aneurysm clips, cochlear implants, certain prosthetic devices, implanted drug infusion pumps, neurostimulators, bone-growth stimulators, certain intrauterine contraceptive devices; or any other type of iron-based metal implants. We discussed the fact that if you are pregnant or suspect that you may be pregnant, you should notify your physician and consult with your primary care or obstetrician before having an MRI. Although rare, we talked about the fact that if contrast dye is used, there is a risk for allergic reaction to the dye. Patients who are allergic to or sensitive to medications, contrast dye, iodine, or shellfish should notify the radiologist or technologist prior to the administration of dye. MRI contrast may also influence other conditions such as allergies, asthma, anemia, hypotension (low blood pressure), and sickle cell disease. The patient has expressed understanding of these risks and I will see the patient back after the MRI to discuss the findings as well as the treatment options.   We talked of the fact that he does have some arthritis in his shoulder and we will obtain an MRI to see if he does have a rotator cuff tear indeed. SUBJECTIVE/OBJECTIVE:  Thanh Tapia (: 1940) is a 78 y.o. male, patient,here for evaluation of the Shoulder Pain (right)  . Patient presents today for evaluation of his right shoulder. He states that he has had symptoms for the last year without any known injury. He points to the anterior portion of the shoulder as the location of his pain. He states that he does have pain at night that wakes him up if he rolls onto that side. He denies any pain that goes down past the elbow and denies any numbness or tingling in the arm. He is right-hand dominant and tries to stay active. He describes the pain is moderate to severe at times. He does take some anti-inflammatories which were not that helpful. Physical Exam    General: Well-dressed well-nourished male in no acute distress, alert and oriented x3  Right shoulder: No tenderness palpation over the right shoulder. Forward flexion to 100 degrees, abduction to 100 degrees. External rotation to 50 degrees and internal rotation to L5.  5/5 strength with full can empty can testing, 4/5 strength with external rotation and 5/5 strength with belly press test.  Does have some positive impingement test with Terryl Link and Neer's. Positive speeds and Yergason's test as well. He does have mild crepitus with passive range of motion of the shoulder especially with external rotation. Sensation is intact to light touch over the axillary nerve distribution of his shoulder and distally in the hand. 2+ radial pulse    Imaging:    3 views of the right shoulder were reviewed from an outside facility and demonstrate moderate degenerative changes of the glenohumeral joint with subchondral sclerosis. He does have some degenerative changes of the acromioclavicular joint as well. Humeral head is well-seated within the glenoid. There are no fractures or dislocations.     No Known Allergies    Current Outpatient Medications   Medication Sig    amLODIPine (NORVASC) 5 mg tablet TAKE 1 TABLET BY MOUTH EVERY DAY    lisinopriL (PRINIVIL, ZESTRIL) 40 mg tablet TAKE 1 TABLET BY MOUTH EVERY DAY BEFORE A MEAL    amiodarone (CORDARONE) 200 mg tablet TAKE 1/2 TABLET BY MOUTH DAILY    atorvastatin (LIPITOR) 10 mg tablet TAKE 1 TABLET BY MOUTH EVERY DAY    carvedilol (COREG) 12.5 mg tablet Take 12.5 mg by mouth two (2) times daily (with meals). Takes in am & pm    Hydrochlorothiazide 12.5 mg tablet Take 12.5 mg by mouth daily. No current facility-administered medications for this visit. Past Medical History:   Diagnosis Date    Arrhythmia 9/3/14    RECENT DX OF ATRIAL FIBRILLATION    Cancer ([facility] [address]     PROSTATE.  Decreased hearing 8/21/2020    Ear problem 8/21/2020    High cholesterol 8/21/2020    HTN (hypertension) 8/21/2020    Hypertension     CONTROLLED BY MEDS.  Other ill-defined conditions(799.89)     Other ill-defined conditions(799.89)     hypercholesterolemia       Past Surgical History:   Procedure Laterality Date    HX APPENDECTOMY      HX CATARACT REMOVAL      both eyes    HX HEENT      repair of left eye detached retina    HX ORTHOPAEDIC      left knee replacement    HX PROSTATECTOMY  5/8/02       Family History   Problem Relation Age of Onset    Heart Disease Mother     Cancer Mother         NOT SURE. GYN ISSUES.  Stroke Father        Social History     Socioeconomic History    Marital status:      Spouse name: Not on file    Number of children: Not on file    Years of education: Not on file    Highest education level: Not on file   Occupational History    Not on file   Tobacco Use    Smoking status: Never Smoker    Smokeless tobacco: Never Used   Substance and Sexual Activity    Alcohol use:  Yes     Alcohol/week: 0.8 standard drinks     Types: 1 Cans of beer per week     Comment: 1-2 per month    Drug use: No    Sexual activity: Yes     Partners: Female   Other Topics Concern    Not on file   Social History Narrative    Not on file     Social Determinants of Health     Financial Resource Strain:     Difficulty of Paying Living Expenses: Not on file   Food Insecurity:     Worried About Running Out of Food in the Last Year: Not on file    [place] of Food in the Last Year: Not on file   Transportation Needs:     Lack of Transportation (Medical): Not on file    Lack of Transportation (Non-Medical): Not on file   Physical Activity:     Days of Exercise per Week: Not on file    Minutes of Exercise per Session: Not on file   Stress:     Feeling of Stress : Not on file   Social Connections:     Frequency of Communication with Friends and Family: Not on file    Frequency of Social Gatherings with Friends and Family: Not on file    Attends Yarsanism Services: Not on file    Active Member of [address] [place] [address] [employer] or Organizations: Not on file    Attends Club or Organization Meetings: Not on file    Marital Status: Not on file   Intimate Partner Violence:     Fear of Current or Ex-Partner: Not on file    Emotionally Abused: Not on file    Physically Abused: Not on file    Sexually Abused: Not on file   Housing Stability:     Unable to Pay for Housing in the Last Year: Not on file    Number of [place] in the Last Year: Not on file    Unstable Housing in the Last Year: Not on file       Review of Systems    Pain Assessment  12/17/2021   Location of Pain Shoulder   Location Modifiers Right   Severity of Pain 7   Quality of Pain Aching;Dull   Duration of Pain Persistent   Frequency of Pain Constant       Vitals:  Ht 5' 11\" (1.803 m)   Wt 205 lb (93 kg)   BMI 28.59 kg/m²    Body mass index is 28.59 kg/m². ROS     Positive for: Musculoskeletal    Last edited by Marty Jeronimo on 12/17/2021  1:32 PM. (History)            An electronic signature was used to authenticate this note.   -- Hazel Monsalve DO

## 2022-02-21 PROBLEM — M75.111 INCOMPLETE ROTATOR CUFF TEAR OR RUPTURE OF RIGHT SHOULDER, NOT SPECIFIED AS TRAUMATIC: Status: ACTIVE | Noted: 2022-02-21

## 2022-02-21 NOTE — PROGRESS NOTES
ASSESSMENT/PLAN:  Below is the assessment and plan developed based on review of pertinent history, physical exam, labs, studies, and medications. 1. Incomplete rotator cuff tear or rupture of right shoulder, not specified as traumatic      No follow-ups on file. In discussion with the patient, we considered the numerus possible diagnoses that could be contributing to their present symptoms. We also deliberated on the extensive management options that must be considered to treat their current condition. We reviewed their accessible prior medical records, diagnostic tests, and current health and employment information. We considered how these symptoms were affecting the patient´s activities of daily living as well as employment and fitness activities. The patient had various questions regarding the possible risks, benefits, complications, morbidity and mortality regarding their diagnosis and treatment options. The patients´ comorbidities were considered, and I advocated that they consider maximizing lifestyle modification through nutrition and exercise to aid in addressing their symptoms. Shared decision making yielded an understanding to move forward with conservation treatment preferences. The patient expressed understanding that if conservative management fails to alleviate the present symptoms they will return to office for re-evaluation and consideration of additional diagnostic tests and potential surgical options. In the interim, we have recommended ice, elevation, and anti-inflammatory medications along with a physician directed home exercise program. We discussed the risks and common side effects of anti-inflammatory medications and instructed the patient to discontinue the medication and contact us if they experienced any side effects. The patient was encouraged to discuss the possible side effects with their family physician or pharmacist prior to initiating any new medications.     We talked about operative and nonoperative treatment of rotator cuff tears. He would like you to continue conservative management. I did offer him some physical therapy which he declined. I am happy to see him back in the future. Imaging:    MRI SHOULDER RT WO CONT  Narrative: EXAM: MRI SHOULDER RT WO CONT    INDICATION: Right shoulder pain    COMPARISON: Radiographs 9/22/2021    TECHNIQUE: Axial proton density fat-saturated; oblique coronal T1, T2  fat-saturated, and proton density fat-saturated; and oblique sagittal T2  fat-saturated MRI of the right shoulder . CONTRAST: None. FINDINGS: A.C. joint: Mild osteoarthritis. There is mild edema along the  superior joint capsule. Anterior acromion process type: 2    Bone marrow: Within normal limits. No acute fracture, dislocation, or marrow  replacing process. Joint fluid: No significant joint effusion. Mild amount of fluid in the  subacromial and subdeltoid bursa. Rotator cuff tendons: There is a partial-thickness articular sided tear in the  supraspinatus insertion measuring approximately 8 x 8 mm. There is medial  delamination along undersurface the tendon for approximately 2 cm. This causes  the distal supraspinatus tendon to be quite attenuated. There is mild thickening  and increased signal in the distal infraspinatus tendon related to tendinosis. There is high-grade partial-thickness tearing of the articular sided  subscapularis. Biceps tendon: The biceps tendon is medially subluxed into the joint space. Muscles: No edema or atrophy. Glenoid labrum: Degenerative changes are seen superior posterior to anterior to  anterior inferior labrum. Glenohumeral joint capsule: The inferior joint capsule is intact. Glenohumeral articular cartilage: Intact. No focal osteochondral lesion. Soft tissue mass: None. Impression: 1. High-grade partial thickness tearing of the distal supraspinatus tendon with  medial undersurface delamination.   2. Distal infraspinatus tendinosis. 3. High-grade partial tearing of the subscapularis tendon allows the long of the  biceps tendon to medially sublux into the joint space. 4. Degenerative changes in the superior posterior to anterior inferior labrum. 5. Mild amount of fluid in the subacromial and subdeltoid bursa. I reviewed the note and agree with their assessment and plan. ASSESSMENT/PLAN:  Below is the assessment and plan developed based on review of pertinent history, physical exam, labs, studies, and medications. 1. Osteoarthritis of right shoulder, unspecified osteoarthritis type  2. Incomplete rotator cuff tear or rupture of right shoulder, not specified as traumatic      In discussion with the patient, we considered the numerus possible diagnoses that could be contributing to their present symptoms. We also deliberated on the extensive management options that must be considered to treat their current condition. We reviewed their accessible prior medical records, diagnostic tests, and current health and employment information. We considered how these symptoms were affecting the patient´s activities of daily living as well as employment and fitness activities. The patient had various questions regarding the possible risks, benefits, complications, morbidity and mortality regarding their diagnosis and treatment options. The patients´ comorbidities were considered, and I advocated that they consider maximizing lifestyle modification through nutrition and exercise to aid in addressing their symptoms. Shared decision making yielded an understanding to move forward with conservation treatment preferences. The patient expressed understanding that if conservative management fails to alleviate the present symptoms they will return to office for re-evaluation and consideration of additional diagnostic tests and potential surgical options.      In the interim, we have recommended ice, elevation,  and anti-inflammatory medications along with a physician directed home exercise program. We discussed the risks and common side effects of anti-inflamatory medications and instructed the patient to discontinue the medication and contact us if they experienced any side effects. The patient was encouraged to discuss the possible side effects with their family physician or pharmacist prior to initiating any new medications. Given that the patient's symptoms are increasing in frequency and duration, we have decided to evaluate the etiology of the pain and loss of function with an MRI. We discussed the risks of an MRI which include, but are not limited to the enclosed space, noisy environment, magnetic effect on implanted metal. We also talked about the fact that MRI is also contraindicated in the presence of internal metallic objects such as bullets or shrapnel, as well as surgical clips, pins, plates, screws, metal sutures, or wire mesh. We talked about the fact that MRI does not use radiation, but it may be contrindicated if the patient has implanted pacemakers, intracranial aneurysm clips, cochlear implants, certain prosthetic devices, implanted drug infusion pumps, neurostimulators, bone-growth stimulators, certain intrauterine contraceptive devices; or any other type of iron-based metal implants. We discussed the fact that if you are pregnant or suspect that you may be pregnant, you should notify your physician and consult with your primary care or obstetrician before having an MRI. Although rare, we talked about the fact that if contrast dye is used, there is a risk for allergic reaction to the dye. Patients who are allergic to or sensitive to medications, contrast dye, iodine, or shellfish should notify the radiologist or technologist prior to the administration of dye. MRI contrast may also influence other conditions such as allergies, asthma, anemia, hypotension (low blood pressure), and sickle cell disease. The patient has expressed understanding of these risks and I will see the patient back after the MRI to discuss the findings as well as the treatment options. We talked of the fact that he does have some arthritis in his shoulder and we will obtain an MRI to see if he does have a rotator cuff tear indeed. SUBJECTIVE/OBJECTIVE:  Julio Cesar Leggett (: 1940) is a 78 y.o. male, patient,here for evaluation of the Shoulder Pain (right)  . Patient presents today for evaluation of his right shoulder. He states that he has had symptoms for the last year without any known injury. He points to the anterior portion of the shoulder as the location of his pain. He states that he does have pain at night that wakes him up if he rolls onto that side. He denies any pain that goes down past the elbow and denies any numbness or tingling in the arm. He is right-hand dominant and tries to stay active. He describes the pain is moderate to severe at times. He does take some anti-inflammatories which were not that helpful. Physical Exam    General: Well-dressed well-nourished male in no acute distress, alert and oriented x3  Right shoulder: No tenderness palpation over the right shoulder. Forward flexion to 100 degrees, abduction to 100 degrees. External rotation to 50 degrees and internal rotation to L5.  5/5 strength with full can empty can testing, 4/5 strength with external rotation and 5/5 strength with belly press test.  Does have some positive impingement test with Anthony Speck and Neer's. Positive speeds and Yergason's test as well. He does have mild crepitus with passive range of motion of the shoulder especially with external rotation. Sensation is intact to light touch over the axillary nerve distribution of his shoulder and distally in the hand.   2+ radial pulse    Imaging:    3 views of the right shoulder were reviewed from an outside facility and demonstrate moderate degenerative changes of the glenohumeral joint with subchondral sclerosis. He does have some degenerative changes of the acromioclavicular joint as well. Humeral head is well-seated within the glenoid. There are no fractures or dislocations. No Known Allergies    Current Outpatient Medications   Medication Sig    amLODIPine (NORVASC) 5 mg tablet TAKE 1 TABLET BY MOUTH EVERY DAY    lisinopriL (PRINIVIL, ZESTRIL) 40 mg tablet TAKE 1 TABLET BY MOUTH EVERY DAY BEFORE A MEAL    amiodarone (CORDARONE) 200 mg tablet TAKE 1/2 TABLET BY MOUTH DAILY    atorvastatin (LIPITOR) 10 mg tablet TAKE 1 TABLET BY MOUTH EVERY DAY    carvedilol (COREG) 12.5 mg tablet Take 12.5 mg by mouth two (2) times daily (with meals). Takes in am & pm    Hydrochlorothiazide 12.5 mg tablet Take 12.5 mg by mouth daily. No current facility-administered medications for this visit. Past Medical History:   Diagnosis Date    Arrhythmia 9/3/14    RECENT DX OF ATRIAL FIBRILLATION    Cancer ([facility] [address]     PROSTATE.  Decreased hearing 8/21/2020    Ear problem 8/21/2020    High cholesterol 8/21/2020    HTN (hypertension) 8/21/2020    Hypertension     CONTROLLED BY MEDS.  Other ill-defined conditions(799.89)     Other ill-defined conditions(799.89)     hypercholesterolemia       Past Surgical History:   Procedure Laterality Date    HX APPENDECTOMY      HX CATARACT REMOVAL      both eyes    HX HEENT      repair of left eye detached retina    HX ORTHOPAEDIC      left knee replacement    HX PROSTATECTOMY  5/8/02       Family History   Problem Relation Age of Onset    Heart Disease Mother     Cancer Mother         NOT SURE. GYN ISSUES.      Stroke Father        Social History     Socioeconomic History    Marital status:      Spouse name: Not on file    Number of children: Not on file    Years of education: Not on file    Highest education level: Not on file   Occupational History    Not on file   Tobacco Use    Smoking status: Never Smoker    Smokeless tobacco: Never Used   Substance and Sexual Activity    Alcohol use: Yes     Alcohol/week: 0.8 standard drinks     Types: 1 Cans of beer per week     Comment: 1-2 per month    Drug use: No    Sexual activity: Yes     Partners: Female   Other Topics Concern    Not on file   Social History Narrative    Not on file     Social Determinants of Health     Financial Resource Strain:     Difficulty of Paying Living Expenses: Not on file   Food Insecurity:     Worried About Running Out of Food in the Last Year: Not on file    [place] of Food in the Last Year: Not on file   Transportation Needs:     Lack of Transportation (Medical): Not on file    Lack of Transportation (Non-Medical): Not on file   Physical Activity:     Days of Exercise per Week: Not on file    Minutes of Exercise per Session: Not on file   Stress:     Feeling of Stress : Not on file   Social Connections:     Frequency of Communication with Friends and Family: Not on file    Frequency of Social Gatherings with Friends and Family: Not on file    Attends Buddhist Services: Not on file    Active Member of [address] [place] [address] or Organizations: Not on file    Attends Club or Organization Meetings: Not on file    Marital Status: Not on file   Intimate Partner Violence:     Fear of Current or Ex-Partner: Not on file    Emotionally Abused: Not on file    Physically Abused: Not on file    Sexually Abused: Not on file   Housing Stability:     Unable to Pay for Housing in the Last Year: Not on file    Number of [place] in the Last Year: Not on file    Unstable Housing in the Last Year: Not on file       Review of Systems    Pain Assessment  12/17/2021   Location of Pain Shoulder   Location Modifiers Right   Severity of Pain 7   Quality of Pain Aching;Dull   Duration of Pain Persistent   Frequency of Pain Constant       Vitals:  Ht 5' 11\" (1.803 m)   Wt 205 lb (93 kg)   BMI 28.59 kg/m²    Body mass index is 28.59 kg/m².     ROS     Positive for: Musculoskeletal    Last edited by Rory Levin on 12/17/2021  1:32 PM. (History)            An electronic signature was used to authenticate this note.   -- Monse Stanford DO   .

## 2022-02-22 ENCOUNTER — OFFICE VISIT (OUTPATIENT)
Dept: ORTHOPEDIC SURGERY | Age: 82
End: 2022-02-22
Payer: MEDICARE

## 2022-02-22 VITALS — WEIGHT: 205 LBS | HEIGHT: 71 IN | BODY MASS INDEX: 28.7 KG/M2

## 2022-02-22 DIAGNOSIS — M75.111 INCOMPLETE ROTATOR CUFF TEAR OR RUPTURE OF RIGHT SHOULDER, NOT SPECIFIED AS TRAUMATIC: Primary | ICD-10-CM

## 2022-02-22 PROCEDURE — G8756 NO BP MEASURE DOC: HCPCS | Performed by: ORTHOPAEDIC SURGERY

## 2022-02-22 PROCEDURE — G8419 CALC BMI OUT NRM PARAM NOF/U: HCPCS | Performed by: ORTHOPAEDIC SURGERY

## 2022-02-22 PROCEDURE — G8432 DEP SCR NOT DOC, RNG: HCPCS | Performed by: ORTHOPAEDIC SURGERY

## 2022-02-22 PROCEDURE — G8536 NO DOC ELDER MAL SCRN: HCPCS | Performed by: ORTHOPAEDIC SURGERY

## 2022-02-22 PROCEDURE — 99214 OFFICE O/P EST MOD 30 MIN: CPT | Performed by: ORTHOPAEDIC SURGERY

## 2022-02-22 PROCEDURE — 1101F PT FALLS ASSESS-DOCD LE1/YR: CPT | Performed by: ORTHOPAEDIC SURGERY

## 2022-02-22 PROCEDURE — G8427 DOCREV CUR MEDS BY ELIG CLIN: HCPCS | Performed by: ORTHOPAEDIC SURGERY

## 2022-02-22 RX ORDER — HYDROCHLOROTHIAZIDE 25 MG/1
TABLET ORAL
COMMUNITY
Start: 2022-02-20

## 2022-02-22 RX ORDER — MELOXICAM 7.5 MG/1
TABLET ORAL
COMMUNITY
Start: 2022-02-17 | End: 2022-09-26

## 2022-03-18 PROBLEM — H91.90 DECREASED HEARING: Status: ACTIVE | Noted: 2020-08-21

## 2022-03-18 PROBLEM — M75.111 INCOMPLETE ROTATOR CUFF TEAR OR RUPTURE OF RIGHT SHOULDER, NOT SPECIFIED AS TRAUMATIC: Status: ACTIVE | Noted: 2022-02-21

## 2022-03-18 PROBLEM — Z13.31 SCREENING FOR DEPRESSION: Status: ACTIVE | Noted: 2021-03-22

## 2022-03-19 PROBLEM — E78.00 HIGH CHOLESTEROL: Status: ACTIVE | Noted: 2020-08-21

## 2022-03-19 PROBLEM — H93.90 EAR PROBLEM: Status: ACTIVE | Noted: 2020-08-21

## 2022-03-19 PROBLEM — M19.011 OSTEOARTHRITIS OF RIGHT SHOULDER: Status: ACTIVE | Noted: 2022-01-25

## 2022-03-19 PROBLEM — I10 ESSENTIAL (PRIMARY) HYPERTENSION: Status: ACTIVE | Noted: 2021-03-22

## 2022-03-20 PROBLEM — Z13.39 SCREENING FOR ALCOHOLISM: Status: ACTIVE | Noted: 2021-03-22

## 2022-03-22 ENCOUNTER — OFFICE VISIT (OUTPATIENT)
Dept: FAMILY MEDICINE CLINIC | Age: 82
End: 2022-03-22
Payer: MEDICARE

## 2022-03-22 VITALS
HEART RATE: 78 BPM | WEIGHT: 207 LBS | RESPIRATION RATE: 18 BRPM | TEMPERATURE: 98.3 F | BODY MASS INDEX: 28.98 KG/M2 | OXYGEN SATURATION: 98 % | SYSTOLIC BLOOD PRESSURE: 149 MMHG | HEIGHT: 71 IN | DIASTOLIC BLOOD PRESSURE: 82 MMHG

## 2022-03-22 DIAGNOSIS — E78.2 MIXED HYPERLIPIDEMIA: ICD-10-CM

## 2022-03-22 DIAGNOSIS — M75.111 INCOMPLETE ROTATOR CUFF TEAR OR RUPTURE OF RIGHT SHOULDER, NOT SPECIFIED AS TRAUMATIC: ICD-10-CM

## 2022-03-22 DIAGNOSIS — R94.6 ABNORMAL THYROID FUNCTION TEST: ICD-10-CM

## 2022-03-22 DIAGNOSIS — Z00.00 MEDICARE ANNUAL WELLNESS VISIT, SUBSEQUENT: Primary | ICD-10-CM

## 2022-03-22 DIAGNOSIS — I10 ESSENTIAL (PRIMARY) HYPERTENSION: ICD-10-CM

## 2022-03-22 DIAGNOSIS — E87.6 HYPOKALEMIA: ICD-10-CM

## 2022-03-22 DIAGNOSIS — C61 PRIMARY MALIGNANT NEOPLASM OF PROSTATE (HCC): ICD-10-CM

## 2022-03-22 DIAGNOSIS — I48.91 ATRIAL FIBRILLATION, UNSPECIFIED TYPE (HCC): ICD-10-CM

## 2022-03-22 PROBLEM — R79.9 ABNORMAL BLOOD CHEMISTRY LEVEL: Status: ACTIVE | Noted: 2022-03-22

## 2022-03-22 PROBLEM — R60.9 EDEMA: Status: ACTIVE | Noted: 2022-03-22

## 2022-03-22 PROBLEM — E78.5 HYPERLIPIDEMIA: Status: ACTIVE | Noted: 2022-03-22

## 2022-03-22 PROBLEM — D12.6 BENIGN NEOPLASM OF COLON: Status: ACTIVE | Noted: 2022-03-22

## 2022-03-22 PROBLEM — M17.9 OSTEOARTHRITIS OF KNEE: Status: ACTIVE | Noted: 2022-03-22

## 2022-03-22 PROBLEM — M15.9 GENERALIZED OSTEOARTHRITIS: Status: ACTIVE | Noted: 2022-03-22

## 2022-03-22 PROBLEM — H35.30 AGE-RELATED MACULAR DEGENERATION: Status: ACTIVE | Noted: 2022-03-22

## 2022-03-22 PROCEDURE — 1101F PT FALLS ASSESS-DOCD LE1/YR: CPT | Performed by: NURSE PRACTITIONER

## 2022-03-22 PROCEDURE — G8432 DEP SCR NOT DOC, RNG: HCPCS | Performed by: NURSE PRACTITIONER

## 2022-03-22 PROCEDURE — G8427 DOCREV CUR MEDS BY ELIG CLIN: HCPCS | Performed by: NURSE PRACTITIONER

## 2022-03-22 PROCEDURE — 1123F ACP DISCUSS/DSCN MKR DOCD: CPT | Performed by: NURSE PRACTITIONER

## 2022-03-22 PROCEDURE — G8536 NO DOC ELDER MAL SCRN: HCPCS | Performed by: NURSE PRACTITIONER

## 2022-03-22 PROCEDURE — G0439 PPPS, SUBSEQ VISIT: HCPCS | Performed by: NURSE PRACTITIONER

## 2022-03-22 PROCEDURE — G8754 DIAS BP LESS 90: HCPCS | Performed by: NURSE PRACTITIONER

## 2022-03-22 PROCEDURE — G8753 SYS BP > OR = 140: HCPCS | Performed by: NURSE PRACTITIONER

## 2022-03-22 PROCEDURE — G8419 CALC BMI OUT NRM PARAM NOF/U: HCPCS | Performed by: NURSE PRACTITIONER

## 2022-03-22 NOTE — ACP (ADVANCE CARE PLANNING)
Advance Care Planning     Advance Care Planning (ACP) Physician/NP/PA Conversation      Date of Conversation: 3/22/2022  Conducted with: Patient with Decision Making Capacity    Healthcare Decision Maker:   No healthcare decision makers have been documented. Click here to complete [address] including selection of the Healthcare Decision Maker Relationship (ie \"Primary\")      Care Preferences:    Hospitalization: \"If your health worsens and it becomes clear that your chance of recovery is unlikely, what would be your preference regarding hospitalization? \"  The patient would prefer hospitalization. Ventilation: \"If you were unable to breathe on your own and your chance of recovery was unlikely, what would be your preference about the use of a ventilator (breathing machine) if it was available to you? \"   The patient would NOT desire the use of a ventilator. Resuscitation: \"In the event your heart stopped as a result of an underlying serious health condition, would you want attempts to be made to restart your heart, or would you prefer a natural death? \"   Yes, attempt to resuscitate.     Additional topics discussed: ventilation preferences    Conversation Outcomes / Follow-Up Plan:   ACP complete - no further action today  Reviewed DNR/DNI and patient elects Full Code (Attempt Resuscitation)     Length of Voluntary ACP Conversation in minutes:  <16 minutes (Non-Billable)    Maryanne Moreno NP

## 2022-03-22 NOTE — PATIENT INSTRUCTIONS
Medicare Wellness Visit, Male    The best way to live healthy is to have a lifestyle where you eat a well-balanced diet, exercise regularly, limit alcohol use, and quit all forms of tobacco/nicotine, if applicable. Regular preventive services are another way to keep healthy. Preventive services (vaccines, screening tests, monitoring & exams) can help personalize your care plan, which helps you manage your own care. Screening tests can find health problems at the earliest stages, when they are easiest to treat. [facility] follows the current, evidence-based guidelines published by the [facility] Darius Kamila ([facility]) when recommending preventive services for our patients. Because we follow these guidelines, sometimes recommendations change over time as research supports it. (For example, a prostate screening blood test is no longer routinely recommended for men with no symptoms). Of course, you and your doctor may decide to screen more often for some diseases, based on your risk and co-morbidities (chronic disease you are already diagnosed with). Preventive services for you include:  - Medicare offers their members a free annual wellness visit, which is time for you and your primary care provider to discuss and plan for your preventive service needs. Take advantage of this benefit every year!  -All adults over age 72 should receive the recommended pneumonia vaccines. Current USPSTF guidelines recommend a series of two vaccines for the best pneumonia protection.   -All adults should have a flu vaccine yearly and tetanus vaccine every 10 years.  -All adults age 48 and older should receive the shingles vaccines (series of two vaccines).        -All adults age 38-68 who are overweight should have a diabetes screening test once every three years.   -Other screening tests & preventive services for persons with diabetes include: an eye exam to screen for diabetic retinopathy, a kidney function test, a foot exam, and stricter control over your cholesterol.   -Cardiovascular screening for adults with routine risk involves an electrocardiogram (ECG) at intervals determined by the provider.   -Colorectal cancer screening should be done for adults age 54-65 with no increased risk factors for colorectal cancer. There are a number of acceptable methods of screening for this type of cancer. Each test has its own benefits and drawbacks. Discuss with your provider what is most appropriate for you during your annual wellness visit. The different tests include: colonoscopy (considered the best screening method), a fecal occult blood test, a fecal DNA test, and sigmoidoscopy.  -All adults born between [facility] should be screened once for Hepatitis C.  -An Abdominal Aortic Aneurysm (AAA) Screening is recommended for men age 73-68 who has ever smoked in their lifetime.      Here is a list of your current Health Maintenance items (your personalized list of preventive services) with a due date:  Health Maintenance Due   Topic Date Due    DTaP/Tdap/Td  (1 - Tdap) Never done    Shingles Vaccine (1 of 2) Never done    Annual Well Visit  Never done    COVID-19 Vaccine (2 - Booster for [employer] series) 05/07/2021    Yearly Flu Vaccine (1) 09/01/2021    Cholesterol Test   03/23/2022

## 2022-03-22 NOTE — PROGRESS NOTES
Chief Complaint   Patient presents with   [facility] Annual Wellness Visit     Visit Vitals  BP (!) 149/82 (BP 1 Location: Right arm, BP Patient Position: Sitting, BP Cuff Size: Adult)   Pulse 78   Temp 98.3 °F (36.8 °C) (Temporal)   Resp 18   Ht 5' 11\" (1.803 m)   Wt 207 lb (93.9 kg)   SpO2 98%   BMI 28.87 kg/m²     Subjective  Foreign Phillip is a 78 y.o. male. HPI:  Patient presented for annual Medicare wellness assessment and follow-up. Patient is new to me. Previous PCP was Dr. Raul Padilla who has departed the practice. Patient was advised that our office will be getting an MD in the near future if he would prefer to continue with a physician. His B/P high in office@ 149/82. He stated that at home a typical reading is 145/78. Stated that this a.m. his BP was 158/91. Checks once or twice a week. He increases how often he checks it if up. Pt not interested in changing the dose of his B/P medication even though he is not at goal in office or at home. He is having a lot of seasonal allergy symptoms. Taking OTC allergy meds. R shoulder pain due to rotator cuff tear. He has decided he is not going to have surgery. Doing home exercises. Has dull pain from shoulder across chest below clavicle-  4/10 pain. Both knees have arthritis. Has had both hips replaced. R knee hurts- he does not want to have anymore surgeries. He saw his cardiologist Dr Elina Garcia on 14 March 2022 and sees every 6 months to monitor his atrial fibrillation. Pt stated Dr. Elina Garcia ordered labs but did not say anything specific about his a fib. which appears to be stable. Hyperlipidemia- last lipid panel was done in March 2021 at which time his LDL was 92, his triglycerides were 111, and his HDL was 57. Currently stable. He well continue on atorvastatin 10 mg daily which is working well for him. Pt stated that he had prostate cancer 15 yrs ago and had prostatectomy. He does not remember the name of the doctor at [address] [place] [address] who he saw.   He does not f/u w/ urology. Gets PSA annually and has been ok. Patient stated that he got J&J COVID-19 vaccination and the booster was Moderna. 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    Incomplete rotator cuff tear or rupture of right shoulder, not specified as traumatic M75.111    Abnormal blood chemistry level R79.9    Age-related macular degeneration H35.30    Atrial fibrillation (HCC) I48.91    Benign neoplasm of colon D12.6    Edema R60.9    Generalized osteoarthritis M15.9    Primary malignant neoplasm of prostate ([facility] C61    Hyperlipidemia E78.5    Osteoarthritis of knee M17.10     Past Medical History:   Diagnosis Date    Arrhythmia 9/3/14    RECENT DX OF ATRIAL FIBRILLATION    Cancer ([facility]     PROSTATE.  Decreased hearing 8/21/2020    Ear problem 8/21/2020    High cholesterol 8/21/2020    HTN (hypertension) 8/21/2020    Hypertension     CONTROLLED BY MEDS.  Other ill-defined conditions(799.89)     Other ill-defined conditions(799.89)     hypercholesterolemia     Past Surgical History:   Procedure Laterality Date    HX APPENDECTOMY      HX CATARACT REMOVAL      both eyes    HX HEENT      repair of left eye detached retina    HX HIP REPLACEMENT      HX KNEE REPLACEMENT      HX ORTHOPAEDIC      left knee replacement    HX PROSTATECTOMY  5/8/02     Current Outpatient Medications   Medication Instructions    amiodarone (CORDARONE) 200 mg tablet TAKE 1/2 TABLET BY MOUTH DAILY    amLODIPine (NORVASC) 5 mg tablet TAKE 1 TABLET BY MOUTH EVERY DAY    atorvastatin (LIPITOR) 10 mg tablet TAKE 1 TABLET BY MOUTH EVERY DAY    hydroCHLOROthiazide (HYDRODIURIL) 25 mg tablet No dose, route, or frequency recorded.     ketoconazole (NIZORAL) 2 % topical cream APPLY CREAM TWO TIMES DAILY TO FEET AND LEFT ANKLE    Klor-Con M20 20 mEq tablet TAKE 1 TABLET BY MOUTH EVERY DAY    lisinopriL (PRINIVIL, ZESTRIL) 40 mg tablet TAKE 1 TABLET BY MOUTH EVERY DAY BEFORE A MEAL    meloxicam (MOBIC) 7.5 mg tablet No dose, route, or frequency recorded. No Known Allergies  Social History     Tobacco Use    Smoking status: Never Smoker    Smokeless tobacco: Never Used   Vaping Use    Vaping Use: Never used   Substance Use Topics    Alcohol use: Yes     Alcohol/week: 0.8 standard drinks     Types: 1 Cans of beer per week     Comment: 1-2 per month    Drug use: No     Family History   Problem Relation Age of Onset    Heart Disease Mother     Cancer Mother         NOT SURE. GYN ISSUES.  Stroke Father        Review of Systems   Constitutional: Negative for chills, fever and malaise/fatigue. HENT: Positive for congestion. Negative for ear pain and sore throat. Eyes: Negative for blurred vision. Has dx of macular degeneration   Respiratory: Positive for cough and shortness of breath. Negative for wheezing. He does not notice SOB but other people call attention to it. Cardiovascular: Positive for leg swelling. Negative for chest pain and palpitations. L ankle swelling   Gastrointestinal: Negative. Negative for abdominal pain, constipation, diarrhea, heartburn, nausea and vomiting. Genitourinary: Negative. Musculoskeletal: Positive for joint pain. Negative for back pain, falls, myalgias and neck pain. R shoulder   Neurological: Negative for dizziness, tingling, sensory change, weakness and headaches. Psychiatric/Behavioral: Negative for depression. The patient has insomnia. The patient is not nervous/anxious. He has trouble falling asleep but naps during the day. Objective  Physical Exam  Vitals reviewed. Constitutional:       General: He is not in acute distress. Appearance: Normal appearance.    HENT: Head: Normocephalic. Right Ear: External ear normal.      Left Ear: External ear normal.      Nose: Nose normal.   Eyes:      Conjunctiva/sclera: Conjunctivae normal.   Neck:      Vascular: No carotid bruit. Cardiovascular:      Rate and Rhythm: Normal rate and regular rhythm. Heart sounds: Normal heart sounds. No murmur heard. Comments: Regularly irregular  Pulmonary:      Effort: Pulmonary effort is normal. No respiratory distress. Breath sounds: Normal breath sounds. Musculoskeletal:         General: No swelling or tenderness. Cervical back: Neck supple. Right lower leg: No edema. Left lower leg: Edema present. Comments: Trace edema  at ankles. Pain w/ active and passive ROM of R shoulder. Raising arm above shoulder painful as is internal and external rotation. Skin:     General: Skin is warm and dry. Coloration: Skin is not jaundiced. Findings: No lesion or rash. Neurological:      Mental Status: He is alert and oriented to person, place, and time. Motor: No weakness. Gait: Gait normal.   Psychiatric:         Mood and Affect: Mood normal.         Behavior: Behavior normal.         Thought Content: Thought content normal.         Judgment: Judgment normal.       Assessment & Plan      ICD-10-CM ICD-9-CM    1. Medicare annual wellness visit, subsequent  Z00.00 V70.0    2. Essential (primary) hypertension  I10 401.9    3. Mixed hyperlipidemia  E78.2 272.2 CBC WITH AUTOMATED DIFF      RETICULOCYTE COUNT      LIPID PANEL   4. Incomplete rotator cuff tear or rupture of right shoulder, not specified as traumatic  M75.111 726.13    5. Hypokalemia  K11.3 753.5 METABOLIC PANEL, COMPREHENSIVE   6. Abnormal thyroid function test  R94.6 794.5 TSH 3RD GENERATION      T4, FREE      TSH 3RD GENERATION      T4, FREE   7. [facility] fibrillation, unspecified [facility]  I48.91 427.31    8.  Primary malignant neoplasm of prostate (HCC)  N75 356 METABOLIC PANEL, COMPREHENSIVE      PSA W/ REFLX FREE PSA      URINALYSIS W/ RFLX MICROSCOPIC     1. Medicare annual wellness visit, subsequent  Encouraged to continue annual wellness assessment. 2. Essential (primary) hypertension  Not at goal in office or at home but patient does not want to add on another BP medication. He was advised to continue to monitor at home. 3. Mixed hyperlipidemia  Stable and well-controlled with current medication regime which she will continue.    - CBC WITH AUTOMATED DIFF  - RETICULOCYTE COUNT  - LIPID PANEL    4. Incomplete rotator cuff tear or rupture of right shoulder, not specified as traumatic  Patient does not want to do surgery so we will continue with conservative treatment at home doing exercises and using OTC medication and ice for pain control. 5. Hypokalemia  Will change potassium dose if indicated. - METABOLIC PANEL, COMPREHENSIVE    6. Abnormal thyroid function test  Amiodarone so important to check his thyroid labs routinely.  - TSH 3RD GENERATION  - T4, FREE    7. Atrial fibrillation, unspecified type ([facility]  Continues with regular follow-up with his cardiologist who is monitoring his status. Currently stable. 8. Primary malignant neoplasm of prostate ([facility]  His PSA has been stable since his prostatectomy 15 years ago. Currently stable. - METABOLIC PANEL, COMPREHENSIVE  - PSA W/ REFLX FREE PSA  - URINALYSIS W/ RFLX MICROSCOPIC      I have discussed the diagnosis with the patient and the intended plan as seen in the above orders. Pt/caretaker has expressed understanding. Questions were answered concerning future plans. I have discussed medication side effects and warnings as indicated with the patient as well.     Jayjay Jenkins NP

## 2022-03-22 NOTE — PROGRESS NOTES
1. \"Have you been to the ER, urgent care clinic since your last visit? Hospitalized since your last visit? \" no    2. \"Have you seen or consulted any other health care providers outside of the [address] [place] [address] since your last visit? \" {no    3. For patients aged 39-70: Has the patient had a colonoscopy / FIT/ Cologuard?na    If the patient is female:    4. For patients aged 41-77: Has the patient had a mammogram within the past 2 years? na      5. For patients aged 21-65: Has the patient had a pap smear? Leslee Rob is a 78 y.o. male is coming in for with the following:     Chief Complaint   Patient presents with    Annual Wellness Visit          With the following vitals:    Visit Vitals  BP (!) 149/82 (BP 1 Location: Right arm, BP Patient Position: Sitting, BP Cuff Size: Adult)   Pulse 78   Temp 98.3 °F (36.8 °C) (Temporal)   Resp 18   Ht 5' 11\" (1.803 m)   Wt 207 lb (93.9 kg)   SpO2 98%   BMI 28.87 kg/m²          This is the Subsequent Medicare Annual Wellness Exam, performed 12 months or more after the Initial AWV or the last Subsequent AWV    I have reviewed the patient's medical history in detail and updated the computerized patient record. Assessment/Plan   Education and counseling provided:  Are appropriate based on today's review and evaluation    1. Essential (primary) hypertension  2. Atrial fibrillation, unspecified type ([facility]  Assessment & Plan:   monitored by specialist. No acute findings meriting change in the plan  Orders:  -     TSH 3RD GENERATION  -     T4, FREE  3. Primary malignant neoplasm of prostate ([facility]  Assessment & Plan:   well controlled, continue current treatment plan  Orders:  -     METABOLIC PANEL, COMPREHENSIVE  -     PSA W/ REFLX FREE PSA  -     URINALYSIS W/ RFLX MICROSCOPIC  4. Incomplete rotator cuff tear or rupture of right shoulder, not specified as traumatic  5.  Mixed hyperlipidemia  -     CBC WITH AUTOMATED DIFF  -     RETICULOCYTE COUNT  - LIPID PANEL       Depression Risk Factor Screening     3 most recent PHQ Screens 9/22/2021   Little interest or pleasure in doing things Not at all   Feeling down, depressed, irritable, or hopeless Not at all   Total Score PHQ 2 0       Alcohol & Drug Abuse Risk Screen    Do you average more than 1 drink per night or more than 7 drinks a week: No    In the past three months have you have had more than 4 drinks containing alcohol on one occasion: No          Functional Ability and Level of Safety    Hearing: Hearing is good. The patient wears hearing aids. Activities of Daily Living: The home contains: no safety equipment. Patient does total self care      Ambulation: with no difficulty and Uses cane to ambulate     Fall Risk:  Fall Risk Assessment, last 12 mths 3/22/2022   Able to walk? Yes   Fall in past 12 months? 0   Do you feel unsteady?  0   Are you worried about falling 0   Is the gait abnormal? -      Abuse Screen:  Patient is not abused       Cognitive Screening    Has your family/caregiver stated any concerns about your memory: no     Cognitive Screening: Abnormal - Clock Drawing Test    Health Maintenance Due     Health Maintenance Due   Topic Date Due    DTaP/Tdap/Td series (1 - Tdap) Never done    Shingrix Vaccine Age 50> (1 of 2) Never done    Pneumococcal 65+ years (2 - PCV) 10/31/2020    COVID-19 Vaccine (2 - Booster for [employer] series) 05/07/2021       Patient Care Team   Patient Care Team:  Reyes Ernst NP as PCP - General (Nurse Practitioner)  Reyes Ernst NP as PCP [facility]    History     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    Incomplete rotator cuff tear or rupture of right shoulder, not specified as traumatic M75.111    Abnormal blood chemistry level R79.9    Age-related macular degeneration H35.30    Atrial fibrillation (HCC) I48.91    Benign neoplasm of colon D12.6    Edema R60.9    Generalized osteoarthritis M15.9    Primary malignant neoplasm of prostate ([facility] C61    Hyperlipidemia E78.5    Osteoarthritis of knee M17.10     Past Medical History:   Diagnosis Date    Arrhythmia 9/3/14    RECENT DX OF ATRIAL FIBRILLATION    Cancer ([facility]     PROSTATE.  Decreased hearing 8/21/2020    Ear problem 8/21/2020    High cholesterol 8/21/2020    HTN (hypertension) 8/21/2020    Hypertension     CONTROLLED BY MEDS.  Other ill-defined conditions(799.89)     Other ill-defined conditions(799.89)     hypercholesterolemia      Past Surgical History:   Procedure Laterality Date    HX APPENDECTOMY      HX CATARACT REMOVAL      both eyes    HX HEENT      repair of left eye detached retina    HX HIP REPLACEMENT      HX KNEE REPLACEMENT      HX ORTHOPAEDIC      left knee replacement    HX PROSTATECTOMY  5/8/02     Current Outpatient Medications   Medication Sig Dispense Refill    amLODIPine (NORVASC) 5 mg tablet TAKE 1 TABLET BY MOUTH EVERY DAY 90 Tablet 1    lisinopriL (PRINIVIL, ZESTRIL) 40 mg tablet TAKE 1 TABLET BY MOUTH EVERY DAY BEFORE A MEAL 90 Tablet 1    meloxicam (MOBIC) 7.5 mg tablet       hydroCHLOROthiazide (HYDRODIURIL) 25 mg tablet       Klor-Con M20 20 mEq tablet TAKE 1 TABLET BY MOUTH EVERY DAY 90 Tablet 3    amiodarone (CORDARONE) 200 mg tablet TAKE 1/2 TABLET BY MOUTH DAILY      atorvastatin (LIPITOR) 10 mg tablet TAKE 1 TABLET BY MOUTH EVERY DAY       No Known Allergies    Family History   Problem Relation Age of Onset    Heart Disease Mother     Cancer Mother         NOT SURE. GYN ISSUES.  Stroke Father      Social History     Tobacco Use    Smoking status: Never Smoker    Smokeless tobacco: Never Used   Substance Use Topics    Alcohol use:  Yes     Alcohol/week: 0.8 standard drinks Types: 1 Cans of beer per week     Comment: 1-2 per month         [facility]

## 2022-03-23 LAB
ALBUMIN SERPL-MCNC: 4.5 G/DL (ref 3.6–4.6)
ALBUMIN/GLOB SERPL: 1.9 {RATIO} (ref 1.2–2.2)
ALP SERPL-CCNC: 86 IU/L (ref 44–121)
ALT SERPL-CCNC: 16 IU/L (ref 0–44)
APPEARANCE UR: CLEAR
AST SERPL-CCNC: 22 IU/L (ref 0–40)
BASOPHILS # BLD AUTO: 0.1 X10E3/UL (ref 0–0.2)
BASOPHILS NFR BLD AUTO: 1 %
BILIRUB SERPL-MCNC: 0.9 MG/DL (ref 0–1.2)
BILIRUB UR QL STRIP: NEGATIVE
BUN SERPL-MCNC: 18 MG/DL (ref 8–27)
BUN/CREAT SERPL: 18 (ref 10–24)
CALCIUM SERPL-MCNC: 9.3 MG/DL (ref 8.6–10.2)
CHLORIDE SERPL-SCNC: 100 MMOL/L (ref 96–106)
CHOLEST SERPL-MCNC: 151 MG/DL (ref 100–199)
CO2 SERPL-SCNC: 24 MMOL/L (ref 20–29)
COLOR UR: YELLOW
CREAT SERPL-MCNC: 1.01 MG/DL (ref 0.76–1.27)
EGFR: 73 ML/MIN/1.73
EOSINOPHIL # BLD AUTO: 0.3 X10E3/UL (ref 0–0.4)
EOSINOPHIL NFR BLD AUTO: 3 %
ERYTHROCYTE [DISTWIDTH] IN BLOOD BY AUTOMATED COUNT: 13.1 % (ref 11.6–15.4)
GLOBULIN SER CALC-MCNC: 2.4 G/DL (ref 1.5–4.5)
GLUCOSE SERPL-MCNC: 84 MG/DL (ref 65–99)
GLUCOSE UR QL STRIP: NEGATIVE
HCT VFR BLD AUTO: 42 % (ref 37.5–51)
HDLC SERPL-MCNC: 55 MG/DL
HGB BLD-MCNC: 14.1 G/DL (ref 13–17.7)
HGB UR QL STRIP: NEGATIVE
IMM GRANULOCYTES # BLD AUTO: 0.1 X10E3/UL (ref 0–0.1)
IMM GRANULOCYTES NFR BLD AUTO: 1 %
KETONES UR QL STRIP: ABNORMAL
LDLC SERPL CALC-MCNC: 77 MG/DL (ref 0–99)
LEUKOCYTE ESTERASE UR QL STRIP: NEGATIVE
LYMPHOCYTES # BLD AUTO: 1.2 X10E3/UL (ref 0.7–3.1)
LYMPHOCYTES NFR BLD AUTO: 13 %
MCH RBC QN AUTO: 29 PG (ref 26.6–33)
MCHC RBC AUTO-ENTMCNC: 33.6 G/DL (ref 31.5–35.7)
MCV RBC AUTO: 86 FL (ref 79–97)
MICRO URNS: ABNORMAL
MONOCYTES # BLD AUTO: 1.1 X10E3/UL (ref 0.1–0.9)
MONOCYTES NFR BLD AUTO: 12 %
NEUTROPHILS # BLD AUTO: 6.8 X10E3/UL (ref 1.4–7)
NEUTROPHILS NFR BLD AUTO: 70 %
NITRITE UR QL STRIP: NEGATIVE
PH UR STRIP: 6.5 [PH] (ref 5–7.5)
PLATELET # BLD AUTO: 292 X10E3/UL (ref 150–450)
POTASSIUM SERPL-SCNC: 2.9 MMOL/L (ref 3.5–5.2)
PROT SERPL-MCNC: 6.9 G/DL (ref 6–8.5)
PROT UR QL STRIP: ABNORMAL
PSA SERPL-MCNC: 0.4 NG/ML (ref 0–4)
RBC # BLD AUTO: 4.87 X10E6/UL (ref 4.14–5.8)
REFLEX CRITERIA: NORMAL
RETICS/RBC NFR AUTO: 1.9 % (ref 0.6–2.6)
SODIUM SERPL-SCNC: 143 MMOL/L (ref 134–144)
SP GR UR STRIP: 1.02 (ref 1–1.03)
T4 FREE SERPL-MCNC: 1.92 NG/DL (ref 0.82–1.77)
TRIGL SERPL-MCNC: 105 MG/DL (ref 0–149)
TSH SERPL DL<=0.005 MIU/L-ACNC: 1.1 UIU/ML (ref 0.45–4.5)
UROBILINOGEN UR STRIP-MCNC: 1 MG/DL (ref 0.2–1)
VLDLC SERPL CALC-MCNC: 19 MG/DL (ref 5–40)
WBC # BLD AUTO: 9.5 X10E3/UL (ref 3.4–10.8)

## 2022-03-24 PROBLEM — H35.30 AGE-RELATED MACULAR DEGENERATION: Status: ACTIVE | Noted: 2022-03-22

## 2022-03-24 PROBLEM — R60.9 EDEMA: Status: ACTIVE | Noted: 2022-03-22

## 2022-03-24 PROBLEM — M15.9 GENERALIZED OSTEOARTHRITIS: Status: ACTIVE | Noted: 2022-03-22

## 2022-03-24 PROBLEM — R79.9 ABNORMAL BLOOD CHEMISTRY LEVEL: Status: ACTIVE | Noted: 2022-03-22

## 2022-03-24 PROBLEM — D12.6 BENIGN NEOPLASM OF COLON: Status: ACTIVE | Noted: 2022-03-22

## 2022-03-24 PROBLEM — M17.9 OSTEOARTHRITIS OF KNEE: Status: ACTIVE | Noted: 2022-03-22

## 2022-03-24 PROBLEM — I48.91 ATRIAL FIBRILLATION (HCC): Status: ACTIVE | Noted: 2022-03-22

## 2022-03-24 PROBLEM — E78.5 HYPERLIPIDEMIA: Status: ACTIVE | Noted: 2022-03-22

## 2022-04-18 DIAGNOSIS — I10 ESSENTIAL (PRIMARY) HYPERTENSION: ICD-10-CM

## 2022-04-18 RX ORDER — AMLODIPINE BESYLATE 5 MG/1
TABLET ORAL
Qty: 90 TABLET | Refills: 1 | Status: SHIPPED | OUTPATIENT
Start: 2022-04-18 | End: 2022-10-17

## 2022-04-18 RX ORDER — LISINOPRIL 40 MG/1
TABLET ORAL
Qty: 90 TABLET | Refills: 1 | Status: SHIPPED | OUTPATIENT
Start: 2022-04-18 | End: 2022-10-26

## 2022-06-12 ENCOUNTER — TELEPHONE (OUTPATIENT)
Dept: FAMILY MEDICINE CLINIC | Age: 82
End: 2022-06-12

## 2022-06-12 RX ORDER — KETOCONAZOLE 20 MG/G
CREAM TOPICAL
COMMUNITY
Start: 2022-04-30 | End: 2022-10-27

## 2022-06-12 NOTE — TELEPHONE ENCOUNTER
Call patient and inform him that I would like him to repeat his thyroid testing and also his metabolic panel. It would be nice if he was fasting for at least 8 hours before he went and did the lab work. I will put in the orders and all he has to do is either come to our office or a community [facility] office and the tech will look up the orders. Is important that he completes the lab work in a timely manner. Thanks.

## 2022-06-13 NOTE — TELEPHONE ENCOUNTER
Spoke with pt wife pt was not home. She said that she would  have him come in this week for that labs. Mynor Sorto

## 2022-06-29 LAB
ALBUMIN SERPL-MCNC: 4.2 G/DL (ref 3.6–4.6)
ALBUMIN/GLOB SERPL: 1.6 {RATIO} (ref 1.2–2.2)
ALP SERPL-CCNC: 71 IU/L (ref 44–121)
ALT SERPL-CCNC: 12 IU/L (ref 0–44)
AST SERPL-CCNC: 18 IU/L (ref 0–40)
BILIRUB SERPL-MCNC: 0.8 MG/DL (ref 0–1.2)
BUN SERPL-MCNC: 14 MG/DL (ref 8–27)
BUN/CREAT SERPL: 16 (ref 10–24)
CALCIUM SERPL-MCNC: 9.3 MG/DL (ref 8.6–10.2)
CHLORIDE SERPL-SCNC: 102 MMOL/L (ref 96–106)
CO2 SERPL-SCNC: 25 MMOL/L (ref 20–29)
CREAT SERPL-MCNC: 0.9 MG/DL (ref 0.76–1.27)
EGFR: 84 ML/MIN/1.73
GLOBULIN SER CALC-MCNC: 2.6 G/DL (ref 1.5–4.5)
GLUCOSE SERPL-MCNC: 85 MG/DL (ref 65–99)
POTASSIUM SERPL-SCNC: 3 MMOL/L (ref 3.5–5.2)
PROT SERPL-MCNC: 6.8 G/DL (ref 6–8.5)
SODIUM SERPL-SCNC: 145 MMOL/L (ref 134–144)
T4 FREE SERPL-MCNC: 1.78 NG/DL (ref 0.82–1.77)
TSH SERPL DL<=0.005 MIU/L-ACNC: 1.53 UIU/ML (ref 0.45–4.5)

## 2022-09-21 ENCOUNTER — TELEPHONE (OUTPATIENT)
Dept: FAMILY MEDICINE CLINIC | Age: 82
End: 2022-09-21

## 2022-09-21 NOTE — TELEPHONE ENCOUNTER
Called patient left voice message appointment cancelled due to Carter Price no longer at practice. Please call office to reschedule with New Provider.

## 2022-09-23 NOTE — TELEPHONE ENCOUNTER
Called patient and left voice message on patient home no. Stating Dory Bigness no longer with practice and appointment cancelled. Asked patient to call office to reschedule with another provider within office. Patient showing he has Medicare and if patient has straight Medicare then can give him appointment with Dr. Chen Izaguirre.

## 2022-09-26 ENCOUNTER — TRANSCRIBE ORDER (OUTPATIENT)
Dept: MRI IMAGING | Age: 82
End: 2022-09-26

## 2022-09-26 ENCOUNTER — OFFICE VISIT (OUTPATIENT)
Dept: FAMILY MEDICINE CLINIC | Age: 82
End: 2022-09-26
Payer: MEDICARE

## 2022-09-26 ENCOUNTER — HOSPITAL ENCOUNTER (OUTPATIENT)
Dept: GENERAL RADIOLOGY | Age: 82
Discharge: HOME | End: 2022-09-26
Payer: MEDICARE

## 2022-09-26 VITALS
WEIGHT: 200 LBS | HEIGHT: 71 IN | BODY MASS INDEX: 28 KG/M2 | TEMPERATURE: 98.2 F | OXYGEN SATURATION: 98 % | HEART RATE: 57 BPM | RESPIRATION RATE: 20 BRPM | SYSTOLIC BLOOD PRESSURE: 147 MMHG | DIASTOLIC BLOOD PRESSURE: 84 MMHG

## 2022-09-26 DIAGNOSIS — R05.9 COUGH: ICD-10-CM

## 2022-09-26 DIAGNOSIS — Z23 IMMUNIZATION DUE: ICD-10-CM

## 2022-09-26 DIAGNOSIS — I10 ESSENTIAL (PRIMARY) HYPERTENSION: ICD-10-CM

## 2022-09-26 DIAGNOSIS — R94.6 ABNORMAL THYROID FUNCTION TEST: ICD-10-CM

## 2022-09-26 DIAGNOSIS — R79.89 ELEVATED SERUM FREE T4 LEVEL: ICD-10-CM

## 2022-09-26 DIAGNOSIS — R05.9 COUGH: Primary | ICD-10-CM

## 2022-09-26 DIAGNOSIS — E87.6 HYPOKALEMIA: ICD-10-CM

## 2022-09-26 DIAGNOSIS — Z76.89 ENCOUNTER TO ESTABLISH CARE WITH NEW DOCTOR: Primary | ICD-10-CM

## 2022-09-26 PROBLEM — Z13.31 SCREENING FOR DEPRESSION: Status: RESOLVED | Noted: 2021-03-22 | Resolved: 2022-09-26

## 2022-09-26 PROBLEM — Z13.39 SCREENING FOR ALCOHOLISM: Status: RESOLVED | Noted: 2021-03-22 | Resolved: 2022-09-26

## 2022-09-26 PROBLEM — R79.9 ABNORMAL BLOOD CHEMISTRY LEVEL: Status: RESOLVED | Noted: 2022-03-22 | Resolved: 2022-09-26

## 2022-09-26 PROCEDURE — 90694 VACC AIIV4 NO PRSRV 0.5ML IM: CPT | Performed by: FAMILY MEDICINE

## 2022-09-26 PROCEDURE — G0008 ADMIN INFLUENZA VIRUS VAC: HCPCS | Performed by: FAMILY MEDICINE

## 2022-09-26 PROCEDURE — 1123F ACP DISCUSS/DSCN MKR DOCD: CPT | Performed by: FAMILY MEDICINE

## 2022-09-26 PROCEDURE — 71046 X-RAY EXAM CHEST 2 VIEWS: CPT

## 2022-09-26 PROCEDURE — 99214 OFFICE O/P EST MOD 30 MIN: CPT | Performed by: FAMILY MEDICINE

## 2022-09-26 NOTE — PROGRESS NOTES
Room 1     Identified pt with two pt identifiers(name and ). Reviewed record in preparation for visit and have obtained necessary documentation. All patient medications has been reviewed. Chief Complaint   Patient presents with    Establish Care     6 month  follow up       3 most recent PHQ Screens 2022   Little interest or pleasure in doing things Not at all   Feeling down, depressed, irritable, or hopeless Not at all   Total Score PHQ 2 0     No flowsheet data found. Health Maintenance Due   Topic    DTaP/Tdap/Td series (1 - Tdap)    Shingrix Vaccine Age 49> (1 of 2)    COVID-19 Vaccine (2 - Booster for [employer] series)    Flu Vaccine (1)    Depression Screen      Health Maintenance Review: Patient reminded of \"due or due soon\" health maintenance. I have asked the patient to contact his/her primary care provider (PCP) for follow-up on his/her health maintenance. Vitals:    22 1255   BP: (!) 147/84   Pulse: (!) 57   Resp: 20   Temp: 98.2 °F (36.8 °C)   TempSrc: Temporal   SpO2: 98%   Weight: 200 lb (90.7 kg)   Height: 5' 11\" (1.803 m)   PainSc:   4       Wt Readings from Last 3 Encounters:   22 200 lb (90.7 kg)   22 207 lb (93.9 kg)   22 205 lb (93 kg)     Temp Readings from Last 3 Encounters:   22 98.2 °F (36.8 °C) (Temporal)   22 98.3 °F (36.8 °C) (Temporal)   21 97.3 °F (36.3 °C) (Temporal)     BP Readings from Last 3 Encounters:   22 (!) 147/84   22 (!) 149/82   21 (!) 154/78     Pulse Readings from Last 3 Encounters:   22 (!) 57   22 78   21 (!) 56       1. \"Have you been to the ER, urgent care clinic since your last visit? Hospitalized since your last visit? \" No    2. \"Have you seen or consulted any other health care providers outside of the [address] [place] [address] since your last visit? \" Yes seen by Dr Mcgregor/Cardiologist on 22    3. For patients aged 39-70: Has the patient had a colonoscopy / FIT/ Cologuard? No

## 2022-09-26 NOTE — PROGRESS NOTES
Subjective  Chief Complaint   Patient presents with    [facility] [place] [facility]     6 month  follow up     HPI:  Irving Duncan is a 78 y.o. male with medical problems as listed below who presents to [facility]. Past medical history: atrial fibrillation, HTN, HLD, prostate cancer in remission, OA, hypokalemia, abnormal thyroid function test  Medications: amiodarone, atorvastatin, HCTZ, amlodipine, lisinopril, potassium, Nizoral shampoo  Allergies: NKDA  Specialists: Dr. Tai Cabrera (Cardiology every 6 months), Dr. Adryan Oconnell (Dermatology every 6 months)  Hospitalizations:None recently  Surgical history: Appendectomy, cataract surgery, right hip replacement, left knee replacement, prostatectomy  Family history: Cancer, CAD (mom). CVA (dad). Social history: No tobacco, 1 beer per month, no illicit drug use. Retired. Worked at a plant as a supervisor, dial, and multiple other positions. Works around the house for exercise. Immunizations: Due for influenza, COVID booster, Shingrix. He is agreeable to influenza today. HTN: BP slightly elevated in clinic and at other clinic visits recently. Compliant with amlodipine 5, HCTZ 25, lisinopril 40. Thyroid disease: Patient has had normal TSH, elevated fT4 two times in a row in the last year. No further work up to date. He states that he has never been prescribed thyroid hormone supplement nor has he been diagnosed with a thyroid problem before. He denies palpitations, chest pain, anxiety, or diarrhea. Hypokalemia: Potassium persistently low (2.9-3.0) in last 6 months. Upon chart review, this dates back to at least 10 years ago. On potassium supplement, 20mEq daily. Patient is on HCTZ daily as well.      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    Incomplete rotator cuff tear or rupture of right shoulder, not specified as traumatic M75.111    Age-related macular degeneration H35.30    Atrial fibrillation (HCC) I48.91    Benign neoplasm of colon D12.6    Edema R60.9    Primary malignant neoplasm of prostate ([facility] [address] C61    Hyperlipidemia E78.5    Osteoarthritis of knee M17.10    Hypokalemia E87.6    Abnormal thyroid function test R94.6     Family History   Problem Relation Age of Onset    Heart Disease Mother     Cancer Mother         NOT SURE. GYN ISSUES. Stroke Father      Social History     Tobacco Use    Smoking status: Never    Smokeless tobacco: Never   Vaping Use    Vaping Use: Never used   Substance Use Topics    Alcohol use: Yes     Alcohol/week: 0.8 standard drinks     Types: 1 Cans of beer per week     Comment: 1-2 per month    Drug use: No     Current Outpatient Medications on File Prior to Visit   Medication Sig Dispense Refill    ketoconazole (NIZORAL) 2 % topical cream APPLY CREAM TWO TIMES DAILY TO FEET AND LEFT ANKLE      amLODIPine (NORVASC) 5 mg tablet TAKE 1 TABLET BY MOUTH EVERY DAY 90 Tablet 1    lisinopriL (PRINIVIL, ZESTRIL) 40 mg tablet TAKE 1 TABLET BY MOUTH EVERY DAY BEFORE A MEAL 90 Tablet 1    hydroCHLOROthiazide (HYDRODIURIL) 25 mg tablet       Klor-Con M20 20 mEq tablet TAKE 1 TABLET BY MOUTH EVERY DAY 90 Tablet 3    amiodarone (CORDARONE) 200 mg tablet TAKE 1/2 TABLET BY MOUTH DAILY      atorvastatin (LIPITOR) 10 mg tablet TAKE 1 TABLET BY MOUTH EVERY DAY       No current facility-administered medications on file prior to visit. No Known Allergies  Review of Systems   Constitutional:  Negative for chills and fever. Respiratory:  Negative for cough, shortness of breath and wheezing. Cardiovascular:  Negative for chest pain, palpitations and leg swelling. Gastrointestinal:  Negative for abdominal pain, constipation, diarrhea, nausea and vomiting. Neurological:  Negative for weakness and headaches. Psychiatric/Behavioral:  Negative for depression. The patient is not nervous/anxious. Objective  Visit Vitals  BP (!) 147/84 (BP 1 Location: Left upper arm, BP Patient Position: Sitting, BP Cuff Size: Large adult)   Pulse (!) 57   Temp 98.2 °F (36.8 °C) (Temporal)   Resp 20   Ht 5' 11\" (1.803 m)   Wt 200 lb (90.7 kg)   SpO2 98%   BMI 27.89 kg/m²     Physical Exam  Constitutional:       General: He is not in acute distress. Appearance: Normal appearance. He is not ill-appearing. HENT:      Head: Normocephalic and atraumatic. Right Ear: External ear normal.      Left Ear: External ear normal.      Nose: Nose normal.      Mouth/Throat:      Mouth: Mucous membranes are moist.   Eyes:      Extraocular Movements: Extraocular movements intact. Conjunctiva/sclera: Conjunctivae normal.   Neck:      Comments: No thyromegaly or nodularity  Cardiovascular:      Rate and Rhythm: Normal rate and regular rhythm. Heart sounds: Normal heart sounds. No murmur heard. Pulmonary:      Effort: Pulmonary effort is normal. No respiratory distress. Breath sounds: Normal breath sounds. Musculoskeletal:         General: No swelling. Normal range of motion. Cervical back: Normal range of motion and neck supple. No tenderness. Right lower leg: No edema. Left lower leg: No edema. Lymphadenopathy:      Cervical: No cervical adenopathy. Skin:     General: Skin is warm and dry. Neurological:      General: No focal deficit present. Mental Status: He is alert and oriented to person, place, and time. Psychiatric:         Mood and Affect: Mood normal.         Behavior: Behavior normal.         Thought Content: Thought content normal.         Judgment: Judgment normal.        Assessment & Plan    ICD-10-CM ICD-9-CM    1. Encounter to establish care with new doctor  Z76.89 V65.8       2. Immunization due  Z23 V05.9 INFLUENZA, FLUAD, (AGE 65 Y+), IM, PF, 0.5 ML      3. Hypokalemia  R73.8 800.9 METABOLIC PANEL, BASIC      4.  Abnormal thyroid function test  R94.6 794.5 TSH 3RD GENERATION      T4, FREE      US THYROID/PARATHYROID/SOFT TISS      TSH RECEPTOR AB        Diagnoses and all orders for this visit:    1. Encounter to establish care with new doctor  Chart reviewed including recent lab results. Diet and exercise counseling. 2. Essential hypertension  Uncontrolled today though did not get a chance to repeat prior to patient leaving. Per chart review, BP initially elevated at Cardiology appointment on 9/22 but 130/80 on repeat. Continue current antihypertensive regimen. Consider increasing amlodipine dose if home blood pressures are persistently elevated. Diet and exercise counseling. 3. Immunization due  -     INFLUENZA, FLUAD, (AGE 65 Y+), IM, PF, 0.5 ML  4. Hypokalemia  -     METABOLIC PANEL, BASIC  Continue potassium supplement at 20mEq daily for now. Will adjust as needed. Can consider replacement of HCTZ as well. 5. Abnormal thyroid function test  -     TSH 3RD GENERATION  -     T4, FREE  -     US THYROID/PARATHYROID/SOFT TISS; Future  -     TSH RECEPTOR AB  No enlargement or nodularity on physical exam. Currently asymptomatic though patient does have history of atrial fibrillation currently on antiarrythmic. Repeat TSH/fT4 pending as well as TRAb and thyroid ultrasound. Aspects of this note have been generated using voice recognition software. Despite editing, there may be some syntax errors.     Taras Brand, DO

## 2022-09-27 PROBLEM — M15.9 GENERALIZED OSTEOARTHRITIS: Status: RESOLVED | Noted: 2022-03-22 | Resolved: 2022-09-27

## 2022-09-27 PROBLEM — H93.90 EAR PROBLEM: Status: RESOLVED | Noted: 2020-08-21 | Resolved: 2022-09-27

## 2022-09-27 LAB
BUN SERPL-MCNC: 14 MG/DL (ref 8–27)
BUN/CREAT SERPL: 15 (ref 10–24)
CALCIUM SERPL-MCNC: 9.4 MG/DL (ref 8.6–10.2)
CHLORIDE SERPL-SCNC: 99 MMOL/L (ref 96–106)
CO2 SERPL-SCNC: 26 MMOL/L (ref 20–29)
CREAT SERPL-MCNC: 0.92 MG/DL (ref 0.76–1.27)
EGFR: 82 ML/MIN/1.73
GLUCOSE SERPL-MCNC: 80 MG/DL (ref 70–99)
POTASSIUM SERPL-SCNC: 2.9 MMOL/L (ref 3.5–5.2)
SODIUM SERPL-SCNC: 143 MMOL/L (ref 134–144)
T4 FREE SERPL-MCNC: 2.02 NG/DL (ref 0.82–1.77)
TSH SERPL DL<=0.005 MIU/L-ACNC: 1.19 UIU/ML (ref 0.45–4.5)

## 2022-09-27 NOTE — PROGRESS NOTES
I spoke with patient on the phone to relay lab results. Free T4 is still elevated, TSH normal. Patient states that he has his ultrasound scheduled for Thursday (9/29). Potassium is persistently (2.9). I advised patient to increase his potassium supplement to 20mEq BID. I will have the  schedule him for follow up of thyroid, low potassium, and blood pressure in 4 weeks at which time we will repeat K level.

## 2022-09-29 ENCOUNTER — HOSPITAL ENCOUNTER (OUTPATIENT)
Dept: ULTRASOUND IMAGING | Age: 82
Discharge: HOME | End: 2022-09-29
Attending: FAMILY MEDICINE
Payer: MEDICARE

## 2022-09-29 DIAGNOSIS — R94.6 ABNORMAL THYROID FUNCTION TEST: ICD-10-CM

## 2022-09-29 PROCEDURE — 76536 US EXAM OF HEAD AND NECK: CPT

## 2022-09-30 NOTE — PROGRESS NOTES
Thyroid ultrasound is normal, but I'm going to refer the patient to Endocrinology for further work up and management of his abnormal thyroid function. His thyroid is producing too much hormone which could cause problems with his history of atrial fibrillation. A referral has been placed so he should receive a call to schedule that appointment.

## 2022-10-25 ENCOUNTER — TELEPHONE (OUTPATIENT)
Dept: FAMILY MEDICINE CLINIC | Age: 82
End: 2022-10-25

## 2022-10-25 NOTE — TELEPHONE ENCOUNTER
Last OV: 9/26/22  Next OV: 10/27/22  Last Refill: 4/18/22 (QTY 90, refills 1)  Last CMP: 9/26/22    Will send to provider for review/auth

## 2022-10-26 RX ORDER — LISINOPRIL 40 MG/1
TABLET ORAL
Qty: 90 TABLET | Refills: 1 | Status: SHIPPED | OUTPATIENT
Start: 2022-10-26

## 2022-10-27 ENCOUNTER — OFFICE VISIT (OUTPATIENT)
Dept: FAMILY MEDICINE CLINIC | Age: 82
End: 2022-10-27
Payer: MEDICARE

## 2022-10-27 VITALS
HEIGHT: 71 IN | TEMPERATURE: 98.2 F | WEIGHT: 202 LBS | RESPIRATION RATE: 18 BRPM | DIASTOLIC BLOOD PRESSURE: 72 MMHG | HEART RATE: 61 BPM | BODY MASS INDEX: 28.28 KG/M2 | OXYGEN SATURATION: 97 % | SYSTOLIC BLOOD PRESSURE: 138 MMHG

## 2022-10-27 DIAGNOSIS — R94.6 ABNORMAL THYROID FUNCTION TEST: ICD-10-CM

## 2022-10-27 DIAGNOSIS — I10 ESSENTIAL (PRIMARY) HYPERTENSION: ICD-10-CM

## 2022-10-27 DIAGNOSIS — E87.6 HYPOKALEMIA: Primary | ICD-10-CM

## 2022-10-27 PROCEDURE — 99214 OFFICE O/P EST MOD 30 MIN: CPT | Performed by: FAMILY MEDICINE

## 2022-10-27 PROCEDURE — 3078F DIAST BP <80 MM HG: CPT | Performed by: FAMILY MEDICINE

## 2022-10-27 PROCEDURE — 1123F ACP DISCUSS/DSCN MKR DOCD: CPT | Performed by: FAMILY MEDICINE

## 2022-10-27 PROCEDURE — 3074F SYST BP LT 130 MM HG: CPT | Performed by: FAMILY MEDICINE

## 2022-10-27 RX ORDER — AMLODIPINE BESYLATE 5 MG/1
5 TABLET ORAL DAILY
Qty: 90 TABLET | Refills: 3 | Status: SHIPPED | OUTPATIENT
Start: 2022-10-27

## 2022-10-27 NOTE — PROGRESS NOTES
1. Have you been to the ER, urgent care clinic since your last visit? Hospitalized since your last visit? No      2. Have you seen or consulted any other health care providers outside of the [address] [place] [address] since your last visit? Include any pap smears or colon screening.  No    Chief Complaint   Patient presents with    Follow-up    Labs     Visit Vitals  /72 (BP 1 Location: Left upper arm, BP Patient Position: Sitting, BP Cuff Size: Large adult)   Pulse 61   Temp 98.2 °F (36.8 °C) (Temporal)   Resp 18   Ht 5' 11\" (1.803 m)   Wt 202 lb (91.6 kg)   SpO2 97%   BMI 28.17 kg/m²

## 2022-10-27 NOTE — PROGRESS NOTES
Subjective  Chief Complaint   Patient presents with    Follow-up    Labs     HPI:  Abhilash Cantu is a 78 y.o. male with medical problems as listed below who presents for follow up of labs. Abnormal thyroid function: TSH has been normal but free T4 has been elevated since 03/2022. Latest free T4 was 2.02. First thing in the morning after patient wakes up he does feel like his heart is racing. He has a history of atrial fibrillation on amiodarone. He denies SOB. Brother had thyroid removed a couple of years ago but patient is unsure why. Referral has been sent to Endocrinology. Patient did not hear back from anyone to schedule this appointment. There is an active order for TSH receptor antibody that hasn't been collected yet. Hypokalemia: Patient was instructed to increase potassium supplement to twice daily with plans to repeat K level today. HTN: Requesting refills of amlodipine 5mg. Continues on HCTZ and lisinopril. 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    Incomplete rotator cuff tear or rupture of right shoulder, not specified as traumatic M75.111    Age-related macular degeneration H35.30    Atrial fibrillation (HCC) I48.91    Benign neoplasm of colon D12.6    Edema R60.9    Primary malignant neoplasm of prostate ([facility] [address] C61    Hyperlipidemia E78.5    Osteoarthritis of knee M17.9    Hypokalemia E87.6    Abnormal thyroid function test R94.6    Open angle with borderline findings, low risk, 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 GYN ISSUES. Stroke Father      Social History     Tobacco Use    Smoking status: Never    Smokeless tobacco: Never   Vaping Use    Vaping Use: Never used   Substance Use Topics    Alcohol use:  Yes     Alcohol/week: 0.8 standard drinks Types: 1 Cans of beer per week     Comment: 1-2 per month    Drug use: No     Current Outpatient Medications on File Prior to Visit   Medication Sig Dispense Refill    lisinopriL (PRINIVIL, ZESTRIL) 40 mg tablet TAKE 1 TABLET BY MOUTH EVERY DAY BEFORE A MEAL 90 Tablet 1    hydroCHLOROthiazide (HYDRODIURIL) 25 mg tablet       Klor-Con M20 20 mEq tablet TAKE 1 TABLET BY MOUTH EVERY DAY 90 Tablet 3    amiodarone (CORDARONE) 200 mg tablet TAKE 1/2 TABLET BY MOUTH DAILY      atorvastatin (LIPITOR) 10 mg tablet TAKE 1 TABLET BY MOUTH EVERY DAY       No current facility-administered medications on file prior to visit. No Known Allergies  Review of Systems   Constitutional:  Negative for chills and fever. Respiratory:  Negative for cough, shortness of breath and wheezing. Cardiovascular:  Positive for palpitations. Negative for chest pain and leg swelling. Gastrointestinal:  Negative for abdominal pain, constipation, diarrhea, nausea and vomiting. Psychiatric/Behavioral:  The patient is not nervous/anxious. Objective  Visit Vitals  /72 (BP 1 Location: Left upper arm, BP Patient Position: Sitting, BP Cuff Size: Large adult)   Pulse 61   Temp 98.2 °F (36.8 °C) (Temporal)   Resp 18   Ht 5' 11\" (1.803 m)   Wt 202 lb (91.6 kg)   SpO2 97%   BMI 28.17 kg/m²     Physical Exam  Constitutional:       General: He is not in acute distress. Appearance: Normal appearance. He is not ill-appearing. HENT:      Head: Normocephalic and atraumatic. Right Ear: External ear normal.      Left Ear: External ear normal.   Eyes:      Extraocular Movements: Extraocular movements intact. Conjunctiva/sclera: Conjunctivae normal.   Pulmonary:      Effort: Pulmonary effort is normal. No respiratory distress. Musculoskeletal:         General: No swelling. Normal range of motion. Cervical back: Normal range of motion and neck supple. Right lower leg: No edema. Left lower leg: No edema.    Skin: General: Skin is warm and dry. Neurological:      General: No focal deficit present. Mental Status: He is alert and oriented to person, place, and time. Psychiatric:         Mood and Affect: Mood normal.         Behavior: Behavior normal.         Thought Content: Thought content normal.         Judgment: Judgment normal.        Assessment & Plan    ICD-10-CM ICD-9-CM    1. Hypokalemia  E87.6 276.8 POTASSIUM      2. Essential (primary) hypertension  I10 401.9 amLODIPine (NORVASC) 5 mg tablet      3. Abnormal thyroid function test  R94.6 794.5 TSH RECEPTOR AB        Diagnoses and all orders for this visit:    1. Hypokalemia  -     POTASSIUM  Repeat K level. Continue potassium 20mEq BID for now. Consider switching HCTZ for another BP med in future. 2. Essential (primary) hypertension  -     amLODIPine (NORVASC) 5 mg tablet; Take 1 Tablet by mouth daily. Well controlled. Continue HCTZ 25mg, lisinopril 40mg, and amlodipine 5mg daily. 3. Abnormal thyroid function test  -     TSH RECEPTOR AB  Will collect TRAb today. Thyroid US normal. Patient provided contact information to schedule Endocrinology appointment. Aspects of this note have been generated using voice recognition software. Despite editing, there may be some syntax errors. Follow-up and Dispositions    Return in about 6 months (around 4/27/2023) for chronic problems.        Ismael Meneses, DO

## 2022-10-28 LAB
POTASSIUM SERPL-SCNC: 3 MMOL/L (ref 3.5–5.2)
TSH RECEP AB SER-ACNC: <1.1 IU/L (ref 0–1.75)

## 2022-10-31 ENCOUNTER — TELEPHONE (OUTPATIENT)
Dept: FAMILY MEDICINE CLINIC | Age: 82
End: 2022-10-31

## 2022-10-31 NOTE — TELEPHONE ENCOUNTER
----- Message from Retta Gottron, DO sent at 10/30/2022  8:51 PM EDT -----  Can you please call patient and ask him if he how he is taking potassium supplement? I had advised him to take it twice daily after his second to last visit. You can let him know that the potassium level is still low.  The additional thyroid lab was normal.

## 2022-10-31 NOTE — PROGRESS NOTES
Can you please call patient and ask him if he how he is taking potassium supplement? I had advised him to take it twice daily after his second to last visit. You can let him know that the potassium level is still low.  The additional thyroid lab was normal.

## 2022-11-10 PROBLEM — H40.013 OPEN ANGLE WITH BORDERLINE FINDINGS, LOW RISK, BILATERAL: Status: ACTIVE | Noted: 2022-11-10

## 2023-01-04 RX ORDER — LISINOPRIL 40 MG/1
TABLET ORAL
Qty: 90 TABLET | Refills: 1 | Status: CANCELLED | OUTPATIENT
Start: 2023-01-04

## 2023-01-04 NOTE — TELEPHONE ENCOUNTER
Requested Prescriptions     Pending Prescriptions Disp Refills    potassium chloride (Klor-Con M20) 20 mEq tablet 90 Tablet 3     Sig: Take 1 Tablet by mouth daily.       Last OV:11/10/23  Next OV:3/20/23  Last Refill:1/3/22  Qty 90  Refills 3

## 2023-01-10 ENCOUNTER — TELEPHONE (OUTPATIENT)
Dept: FAMILY MEDICINE CLINIC | Age: 83
End: 2023-01-10

## 2023-01-10 RX ORDER — POTASSIUM CHLORIDE 20 MEQ/1
20 TABLET, EXTENDED RELEASE ORAL 2 TIMES DAILY
Qty: 180 TABLET | Refills: 0 | Status: SHIPPED | OUTPATIENT
Start: 2023-01-10

## 2023-01-10 NOTE — TELEPHONE ENCOUNTER
I called to advise the pt that he needs to take his potassium twice daily. No answer and a vm for a return call was left.

## 2023-01-10 NOTE — TELEPHONE ENCOUNTER
Please let patient know he needs to take his potassium twice daily instead of once daily. We can repeat his potassium level when he comes back in March.

## 2023-01-23 ENCOUNTER — APPOINTMENT (OUTPATIENT)
Dept: GENERAL RADIOLOGY | Age: 83
End: 2023-01-23
Attending: STUDENT IN AN ORGANIZED HEALTH CARE EDUCATION/TRAINING PROGRAM
Payer: MEDICARE

## 2023-01-23 ENCOUNTER — HOSPITAL ENCOUNTER (EMERGENCY)
Age: 83
Discharge: HOME | End: 2023-01-23
Attending: STUDENT IN AN ORGANIZED HEALTH CARE EDUCATION/TRAINING PROGRAM
Payer: MEDICARE

## 2023-01-23 VITALS
DIASTOLIC BLOOD PRESSURE: 83 MMHG | HEART RATE: 63 BPM | TEMPERATURE: 98.7 F | OXYGEN SATURATION: 94 % | SYSTOLIC BLOOD PRESSURE: 142 MMHG | RESPIRATION RATE: 18 BRPM

## 2023-01-23 DIAGNOSIS — E87.6 HYPOKALEMIA: ICD-10-CM

## 2023-01-23 DIAGNOSIS — R07.9 ACUTE CHEST PAIN: Primary | ICD-10-CM

## 2023-01-23 LAB
ANION GAP SERPL CALC-SCNC: 10 MMOL/L (ref 5–15)
ATRIAL RATE: 58 BPM
BUN SERPL-MCNC: 16 MG/DL (ref 6–20)
BUN/CREAT SERPL: 20 (ref 12–20)
CA-I BLD-MCNC: 9.2 MG/DL (ref 8.5–10.1)
CALCULATED P AXIS, ECG09: 60 DEGREES
CALCULATED R AXIS, ECG10: 6 DEGREES
CALCULATED T AXIS, ECG11: 50 DEGREES
CHLORIDE SERPL-SCNC: 106 MMOL/L (ref 97–108)
CO2 SERPL-SCNC: 24 MMOL/L (ref 21–32)
CREAT SERPL-MCNC: 0.82 MG/DL (ref 0.7–1.3)
DIAGNOSIS, 93000: NORMAL
ERYTHROCYTE [DISTWIDTH] IN BLOOD BY AUTOMATED COUNT: 13.7 % (ref 11.5–14.5)
GLUCOSE SERPL-MCNC: 86 MG/DL (ref 65–100)
HCT VFR BLD AUTO: 40.9 % (ref 36.6–50.3)
HGB BLD-MCNC: 13.6 G/DL (ref 12.1–17)
MCH RBC QN AUTO: 28.7 PG (ref 26–34)
MCHC RBC AUTO-ENTMCNC: 33.3 G/DL (ref 30–36.5)
MCV RBC AUTO: 86.3 FL (ref 80–99)
NRBC # BLD: 0 K/UL (ref 0–0.01)
NRBC BLD-RTO: 0 PER 100 WBC
P-R INTERVAL, ECG05: 248 MS
PLATELET # BLD AUTO: 265 K/UL (ref 150–400)
PMV BLD AUTO: 9.1 FL (ref 8.9–12.9)
POTASSIUM SERPL-SCNC: 2.9 MMOL/L (ref 3.5–5.1)
POTASSIUM SERPL-SCNC: 3 MMOL/L (ref 3.5–5.1)
Q-T INTERVAL, ECG07: 482 MS
QRS DURATION, ECG06: 88 MS
QTC CALCULATION (BEZET), ECG08: 473 MS
RBC # BLD AUTO: 4.74 M/UL (ref 4.1–5.7)
SODIUM SERPL-SCNC: 140 MMOL/L (ref 136–145)
TROPONIN-HIGH SENSITIVITY: 15 NG/L (ref 0–76)
TROPONIN-HIGH SENSITIVITY: 16 NG/L (ref 0–76)
VENTRICULAR RATE, ECG03: 58 BPM
WBC # BLD AUTO: 8.1 K/UL (ref 4.1–11.1)

## 2023-01-23 PROCEDURE — 99285 EMERGENCY DEPT VISIT HI MDM: CPT

## 2023-01-23 PROCEDURE — 96376 TX/PRO/DX INJ SAME DRUG ADON: CPT

## 2023-01-23 PROCEDURE — 71046 X-RAY EXAM CHEST 2 VIEWS: CPT

## 2023-01-23 PROCEDURE — 36415 COLL VENOUS BLD VENIPUNCTURE: CPT

## 2023-01-23 PROCEDURE — 85027 COMPLETE CBC AUTOMATED: CPT

## 2023-01-23 PROCEDURE — 96375 TX/PRO/DX INJ NEW DRUG ADDON: CPT

## 2023-01-23 PROCEDURE — 74011250637 HC RX REV CODE- 250/637: Performed by: STUDENT IN AN ORGANIZED HEALTH CARE EDUCATION/TRAINING PROGRAM

## 2023-01-23 PROCEDURE — 96366 THER/PROPH/DIAG IV INF ADDON: CPT

## 2023-01-23 PROCEDURE — 93005 ELECTROCARDIOGRAM TRACING: CPT

## 2023-01-23 PROCEDURE — 84132 ASSAY OF SERUM POTASSIUM: CPT

## 2023-01-23 PROCEDURE — 96374 THER/PROPH/DIAG INJ IV PUSH: CPT

## 2023-01-23 PROCEDURE — 96365 THER/PROPH/DIAG IV INF INIT: CPT

## 2023-01-23 PROCEDURE — 74011250636 HC RX REV CODE- 250/636: Performed by: STUDENT IN AN ORGANIZED HEALTH CARE EDUCATION/TRAINING PROGRAM

## 2023-01-23 PROCEDURE — 84484 ASSAY OF TROPONIN QUANT: CPT

## 2023-01-23 RX ORDER — MAGNESIUM SULFATE HEPTAHYDRATE 40 MG/ML
2 INJECTION, SOLUTION INTRAVENOUS
Status: COMPLETED | OUTPATIENT
Start: 2023-01-23 | End: 2023-01-23

## 2023-01-23 RX ORDER — POTASSIUM CHLORIDE 1.5 G/1.77G
40 POWDER, FOR SOLUTION ORAL
Status: COMPLETED | OUTPATIENT
Start: 2023-01-23 | End: 2023-01-23

## 2023-01-23 RX ORDER — POTASSIUM CHLORIDE 7.45 MG/ML
10 INJECTION INTRAVENOUS
Status: DISPENSED | OUTPATIENT
Start: 2023-01-23 | End: 2023-01-23

## 2023-01-23 RX ADMIN — MAGNESIUM SULFATE HEPTAHYDRATE 2 G: 40 INJECTION, SOLUTION INTRAVENOUS at 17:16

## 2023-01-23 RX ADMIN — POTASSIUM CHLORIDE 10 MEQ: 7.46 INJECTION, SOLUTION INTRAVENOUS at 17:17

## 2023-01-23 RX ADMIN — POTASSIUM CHLORIDE 10 MEQ: 7.46 INJECTION, SOLUTION INTRAVENOUS at 18:37

## 2023-01-23 RX ADMIN — POTASSIUM CHLORIDE 40 MEQ: 1.5 POWDER, FOR SOLUTION ORAL at 17:17

## 2023-01-23 NOTE — ED PROVIDER NOTES
[facility]  EMERGENCY DEPARTMENT ENCOUNTER NOTE        Date: 1/23/2023  Patient Name: Hola Antonio      History of Presenting Illness     Chief Complaint   Patient presents with    Chest Pain       History Provided By: Patient    HPI: Hola Antonio, 78 y.o. male with PMH of HTN, HLD, atrial fibrillation on amiodarone, and hearing impairment comes to the ED with chest pain that started earlier today as he described it as feeling pressure on the left side, radiating to nothing specific makes it better or worse associated feeling shaky earlier today. Symptoms have subsided. No associated nausea, vomiting, shortness of breath. No lower extremity swelling or pain. He is denying any pain anywhere else. PCP: Linda Bess, DO    Current Outpatient Medications   Medication Sig Dispense Refill    potassium chloride (Klor-Con M20) 20 mEq tablet Take 1 Tablet by mouth two (2) times a day for 15 days. 30 Tablet 0    amLODIPine (NORVASC) 5 mg tablet Take 1 Tablet by mouth daily. 90 Tablet 3    lisinopriL (PRINIVIL, ZESTRIL) 40 mg tablet TAKE 1 TABLET BY MOUTH EVERY DAY BEFORE A MEAL 90 Tablet 1    hydroCHLOROthiazide (HYDRODIURIL) 25 mg tablet       amiodarone (CORDARONE) 200 mg tablet TAKE 1/2 TABLET BY MOUTH DAILY      atorvastatin (LIPITOR) 10 mg tablet TAKE 1 TABLET BY MOUTH EVERY DAY         Past History     Past Medical History:  Past Medical History:   Diagnosis Date    Arrhythmia 09/03/2014    RECENT DX OF ATRIAL FIBRILLATION    Arthritis     Cancer ([facility] [address]     PROSTATE. Decreased hearing 08/21/2020    Ear problem 08/21/2020    High cholesterol 08/21/2020    HTN (hypertension) 08/21/2020    Hypertension     CONTROLLED BY MEDS.     Other ill-defined conditions(799.89)     Other ill-defined conditions(799.89)     hypercholesterolemia       Past Surgical History:  Past Surgical History:   Procedure Laterality Date    HX APPENDECTOMY      HX CATARACT REMOVAL both eyes    HX HEENT      repair of left eye detached retina    HX HIP REPLACEMENT Right     HX KNEE REPLACEMENT Left     HX PROSTATECTOMY  05/08/2002       Family History:  Family History   Problem Relation Age of Onset    Heart Disease Mother     Cancer Mother         NOT SURE. GYN ISSUES. Stroke Father        Social History:  Social History     Tobacco Use    Smoking status: Never    Smokeless tobacco: Never   Vaping Use    Vaping Use: Never used   Substance Use Topics    Alcohol use: Yes     Alcohol/week: 0.8 standard drinks     Types: 1 Cans of beer per week     Comment: 1-2 per month    Drug use: No       Allergies:  No Known Allergies      Review of Systems     Review of Systems    A 10 point review of system was performed and was negative except as noted above in HPI    Physical Exam     Physical Exam  Vitals and nursing note reviewed. Constitutional:       General: He is not in acute distress. Appearance: He is not ill-appearing, toxic-appearing or diaphoretic. HENT:      Head: Normocephalic and atraumatic. Cardiovascular:      Rate and Rhythm: Normal rate and regular rhythm. Heart sounds: Normal heart sounds. Pulmonary:      Effort: Pulmonary effort is normal.      Breath sounds: Normal breath sounds. Chest:      Chest wall: Tenderness present. Abdominal:      Palpations: Abdomen is soft. Tenderness: There is no abdominal tenderness. Musculoskeletal:      Cervical back: Normal range of motion and neck supple. Right lower leg: No tenderness. No edema. Left lower leg: No tenderness. No edema. Skin:     General: Skin is warm and dry. Neurological:      Mental Status: He is alert and oriented to person, place, and time. Lab and Diagnostic Study Results     Labs -   No results found for this or any previous visit (from the past 12 hour(s)).     Radiologic Studies -   [unfilled]  CT Results  (Last 48 hours)      None          CXR Results  (Last 48 hours) 01/23/23 1557  XR CHEST PA LAT Final result    Impression:  1. Nodule within the lingula. Chest CT is recommended       Narrative:  INDICATION:  Chest Pain        Exam: Chest 2 views. Comparison: 9/26/2022. Findings: Cardiomediastinal silhouette is normal. Pulmonary vasculature is not   engorged. No consolidation. 12 mm nodule within the lingula. No pleural   effusion. Bony thorax is intact. Medical Decision Making and ED Course   - I am the first and primary provider for this patient AND AM THE PRIMARY PROVIDER OF RECORD. - I reviewed the vital signs, available nursing notes, past medical history, past surgical history, family history and social history. - Initial assessment performed. The patients presenting problems have been discussed, and the staff are in agreement with the care plan formulated and outlined with them. I have encouraged them to ask questions as they arise throughout their visit. Vital Signs-Reviewed the patient's vital signs. Patient Vitals for the past 24 hrs:   Pulse Resp BP SpO2   01/23/23 2118 63 18 (!) 142/83 94 %   01/23/23 1910 62 17 -- 98 %       Records Reviewed: Nursing Notes      Medical Decision Making     Patient is 55-year-old male whose care is impacted by his current comorbidities including hyperlipidemia, hypertension, and atrial fibrillation comes to the ED with chest pain described as pressure, left-sided nonreproducible palpation without associated radiation. No shortness of breath, nausea, vomiting, or diaphoresis. He does not have any lower extremity swelling or pain. He has significant comorbidities for ACS. My concern for him is ACS rather than any other diagnoses. He does not have a lower extremity swelling, pain, tachycardia, tachypnea, or hypoxia. Highly unlikely to be secondary to PE. Currently he is asymptomatic which makes it less likely be PE.   He does not have any abrupt onset of pain and there is no pulse deficits. No sensory or motor deficits. Highly unlikely to be secondary to thoracic artery dissection. Will get CBC, chemistry, troponin, and chest x-ray for risk stratification. ED Course & Reassessment     Medications ordered:  Medications   potassium chloride 10 mEq in 100 ml IVPB (10 mEq IntraVENous New Bag 1/23/23 1837)   magnesium sulfate 2 g/50 ml IVPB (premix or compounded) (2 g IntraVENous New Bag 1/23/23 1716)   potassium chloride (KLOR-CON) packet for solution 40 mEq (40 mEq Oral Given 1/23/23 1717)       ED Course:     ED Course as of 01/24/23 1528   Mon Jan 23, 2023   1510 EKG was done at 2:55 PM and read by me as sinus bradycardia rate of 58, IN is prolonged, QRS and QTc within normal, normal axis, no ST elevation or depression new criteria, no signs of dysrhythmia or additional signs of blocks aside from first-degree AV block. There is significant baseline motion artifact. [AA]   1900 I reviewed interpreted patient work-up dependently. CBC without any acute abnormality. No anemia, leukocytosis, or thrombocytopenia/thrombocytosis. Chemistry showed mild hypokalemia that was corrected orally and intravenously. Repeat potassium showed that the potassium standing up. We will send a prescription to the pharmacy with potassium supplementation and instruct the patient to follow-up with PMD.  Troponins x2 is negative. Thus, ACS is unlikely and ruled out. ED evaluation, work-up, clinical impression, and disposition was discussed with the patient. Patient is agreeable. Patient verbalized understanding and will be able to arrange follow-up. Anticipatory guidance and return precautions discussed with the patient. At the time of discharge, all concerns have been addressed and patient had no further questions. Patient is hemodynamically stable and appropriate for discharge. [AA]      ED Course User Index  [AA] Stephy Rosado MD         Diagnosis     Clinical Impression:   1.  Acute chest pain    2. Hypokalemia          Disposition     Disposition: Condition improved  DC- Adult Discharges: All of the diagnostic tests were reviewed and questions answered. Diagnosis, care plan and treatment options were discussed. The patient understands the instructions and will follow up as directed. The patients results have been reviewed with them. They have been counseled regarding their diagnosis. The patient verbally convey understanding and agreement of the signs, symptoms, diagnosis, treatment and prognosis and additionally agrees to follow up as recommended with their PCP in 24 - 48 hours. They also agree with the care-plan and convey that all of their questions have been answered. I have also put together some discharge instructions for them that include: 1) educational information regarding their diagnosis, 2) how to care for their diagnosis at home, as well a 3) list of reasons why they would want to return to the ED prior to their follow-up appointment, should their condition change. Discharged      DISCHARGE PLAN:  1. Follow-up Information       Follow up With Specialties Details Why [address] EMERGENCY DEPT Emergency Medicine Go to  As needed, If symptoms worsen [address], DO Family Medicine Schedule an appointment as soon as possible for a visit in 3 days For reevaluation, Discuss your visit to the ER [address] [facility]  [phone]            2. Return to ED if worse   3. Discharge Medication List as of 1/23/2023  9:16 PM        CONTINUE these medications which have NOT CHANGED    Details   potassium chloride (Klor-Con M20) 20 mEq tablet Take 1 Tablet by mouth two (2) times a day., Normal, Disp-180 Tablet, R-0      amLODIPine (NORVASC) 5 mg tablet Take 1 Tablet by mouth daily. , Normal, Disp-90 Tablet, R-3      lisinopriL (PRINIVIL, ZESTRIL) 40 mg tablet TAKE 1 TABLET BY MOUTH EVERY DAY BEFORE A MEAL, Normal, Disp-90 Tablet, R-1      hydroCHLOROthiazide (HYDRODIURIL) 25 mg tablet Historical Med      amiodarone (CORDARONE) 200 mg tablet TAKE 1/2 TABLET BY MOUTH DAILY, Historical Med      atorvastatin (LIPITOR) 10 mg tablet TAKE 1 TABLET BY MOUTH EVERY DAY, Historical Med               Attestations: Wilber Cunningham MD    Please note that this dictation was completed with "[employer] the computer voice recognition software. Quite often unanticipated grammatical, syntax, homophones, and other interpretive errors are inadvertently transcribed by the computer software. Please disregard these errors. Please excuse any errors that have escaped final proofreading. Thank you.

## 2023-01-24 RX ORDER — POTASSIUM CHLORIDE 20 MEQ/1
20 TABLET, EXTENDED RELEASE ORAL 2 TIMES DAILY
Qty: 30 TABLET | Refills: 0 | Status: SHIPPED | OUTPATIENT
Start: 2023-01-24 | End: 2023-01-26 | Stop reason: SDUPTHER

## 2023-01-24 NOTE — DISCHARGE INSTRUCTIONS
Thank you! Thank you for allowing me to care for you in the emergency department. It is my goal to provide you with excellent care. If you have not received excellent quality care, please ask to speak to the nurse manager. Please fill out the survey that will come to you by mail or email since we listen to your feedback! Below you will find a list of your tests from today's visit. Should you have any questions, please do not hesitate to call the emergency department.     Labs  Recent Results (from the past 12 hour(s))   EKG, 12 LEAD, INITIAL    Collection Time: 01/23/23  2:55 PM   Result Value Ref Range    Ventricular Rate 58 BPM    Atrial Rate 58 BPM    P-R Interval 248 ms    QRS Duration 88 ms    Q-T Interval 482 ms    QTC Calculation (Bezet) 473 ms    Calculated P Axis 60 degrees    Calculated R Axis 6 degrees    Calculated T Axis 50 degrees    Diagnosis       Sinus bradycardia with marked sinus arrhythmia with 1st degree A-V block  Marked ST abnormality, possible inferior subendocardial injury  Abnormal ECG  When compared with ECG of 28-JAN-2015 13:13,  ST now depressed in Inferior leads  Nonspecific T wave abnormality, improved in Inferior leads  T wave inversion no longer evident in Anterolateral leads  [id number] on 1/23/2023 0:68:66 PM     METABOLIC PANEL, BASIC    Collection Time: 01/23/23  3:11 PM   Result Value Ref Range    Sodium 140 136 - 145 mmol/L    Potassium 2.9 (L) 3.5 - 5.1 mmol/L    Chloride 106 97 - 108 mmol/L    CO2 24 21 - 32 mmol/L    Anion gap 10 5 - 15 mmol/L    Glucose 86 65 - 100 mg/dL    BUN 16 6 - 20 mg/dL    Creatinine 0.82 0.70 - 1.30 mg/dL    BUN/Creatinine ratio 20 12 - 20      eGFR >60 >60 ml/min/1.73m2    Calcium 9.2 8.5 - 10.1 mg/dL   CBC W/O DIFF    Collection Time: 01/23/23  3:11 PM   Result Value Ref Range    WBC 8.1 4.1 - 11.1 K/uL    RBC 4.74 4.10 - 5.70 M/uL    HGB 13.6 12.1 - 17.0 g/dL    HCT 40.9 36.6 - 50.3 %    MCV 86.3 80.0 - 99.0 FL MCH 28.7 26.0 - 34.0 PG    MCHC 33.3 30.0 - 36.5 g/dL    RDW 13.7 11.5 - 14.5 %    PLATELET 109 524 - 677 K/uL    MPV 9.1 8.9 - 12.9 FL    NRBC 0.0 0.0  WBC    ABSOLUTE NRBC 0.00 0.00 - 0.01 K/uL   TROPONIN-HIGH SENSITIVITY    Collection Time: 01/23/23  3:11 PM   Result Value Ref Range    Troponin-High Sensitivity 16 0 - 76 ng/L   TROPONIN-HIGH SENSITIVITY    Collection Time: 01/23/23  6:42 PM   Result Value Ref Range    Troponin-High Sensitivity 15 0 - 76 ng/L   POTASSIUM    Collection Time: 01/23/23  8:24 PM   Result Value Ref Range    Potassium 3.0 (L) 3.5 - 5.1 mmol/L       Radiologic Studies  XR CHEST PA LAT   Final Result   1. Nodule within the lingula. Chest CT is recommended        CT Results  (Last 48 hours)      None          CXR Results  (Last 48 hours)                 01/23/23 1557  XR CHEST PA LAT Final result    Impression:  1. Nodule within the lingula. Chest CT is recommended       Narrative:  INDICATION:  Chest Pain        Exam: Chest 2 views. Comparison: 9/26/2022. Findings: Cardiomediastinal silhouette is normal. Pulmonary vasculature is not   engorged. No consolidation. 12 mm nodule within the lingula. No pleural   effusion. Bony thorax is intact.                 ------------------------------------------------------------------------------------------------------------  The exam and treatment you received in the Emergency Department were for an urgent problem and are not intended as complete care. It is important that you follow-up with a doctor, nurse practitioner, or physician assistant to:  (1) confirm your diagnosis,  (2) re-evaluation of changes in your illness and treatment, and  (3) for ongoing care. Please take your discharge instructions with you when you go to your follow-up appointment. If you have any problem arranging a follow-up appointment, contact the Emergency Department.   If your symptoms become worse or you do not improve as expected and you are unable to reach your health care provider, please return to the Emergency Department. We are available 24 hours a day. If a prescription has been provided, please have it filled as soon as possible to prevent a delay in treatment. If you have any questions or reservations about taking the medication due to side effects or interactions with other medications, please call your primary care provider or contact the ER.

## 2023-01-26 ENCOUNTER — OFFICE VISIT (OUTPATIENT)
Dept: FAMILY MEDICINE CLINIC | Age: 83
End: 2023-01-26
Payer: MEDICARE

## 2023-01-26 VITALS
WEIGHT: 205 LBS | SYSTOLIC BLOOD PRESSURE: 164 MMHG | TEMPERATURE: 97.8 F | BODY MASS INDEX: 28.7 KG/M2 | RESPIRATION RATE: 18 BRPM | HEART RATE: 62 BPM | DIASTOLIC BLOOD PRESSURE: 81 MMHG | OXYGEN SATURATION: 99 % | HEIGHT: 71 IN

## 2023-01-26 DIAGNOSIS — R94.6 ABNORMAL THYROID FUNCTION TEST: ICD-10-CM

## 2023-01-26 DIAGNOSIS — R07.9 CHEST PAIN, UNSPECIFIED TYPE: Primary | ICD-10-CM

## 2023-01-26 DIAGNOSIS — E87.6 HYPOKALEMIA: ICD-10-CM

## 2023-01-26 RX ORDER — AMLODIPINE BESYLATE 10 MG/1
10 TABLET ORAL DAILY
Qty: 90 TABLET | Refills: 3 | Status: SHIPPED | OUTPATIENT
Start: 2023-01-26

## 2023-01-26 RX ORDER — POTASSIUM CHLORIDE 20 MEQ/1
20 TABLET, EXTENDED RELEASE ORAL 2 TIMES DAILY
Qty: 180 TABLET | Refills: 1 | Status: SHIPPED | OUTPATIENT
Start: 2023-01-26 | End: 2023-02-10

## 2023-01-26 NOTE — PROGRESS NOTES
1. Have you been to the ER, urgent care clinic since your last visit? Hospitalized since your last visit? Yes, [facility] [employer] [facility]. DX Acute chest pain and hypokalemia. 2. Have you seen or consulted any other health care providers outside of the [address] [place] [address] since your last visit? Include any pap smears or colon screening.  No      Chief Complaint   Patient presents with    Hospital Follow Up     Visit Vitals  BP (!) 164/81 (BP 1 Location: Left upper arm, BP Patient Position: Sitting, BP Cuff Size: Large adult)   Pulse 62   Temp 97.8 °F (36.6 °C) (Tympanic)   Resp 18   Ht 5' 11\" (1.803 m)   Wt 205 lb (93 kg)   SpO2 99%   BMI 28.59 kg/m²

## 2023-01-26 NOTE — PROGRESS NOTES
Subjective  Chief Complaint   Patient presents with    Hospital Follow Up     HPI:  Leighton Moreira is a 78 y.o. male with medical problems as listed below who presents for ED follow up. ED follow up/chest pain: Patient was seen in ED on 1/23 for chest pain. Pain resolved while in ED. Troponins were negative x2. Lbs were significant for low potassium. He was discharged home. He denies any further episodes of chest pain. Hyperthyroidism: Patient has still not been able to arrange Endocrinology follow up. Last TSH/fT4 1.190/2.02 in 09/2022. 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    Incomplete rotator cuff tear or rupture of right shoulder, not specified as traumatic M75.111    Age-related macular degeneration H35.30    Atrial fibrillation (HCC) I48.91    Benign neoplasm of colon D12.6    Edema R60.9    Primary malignant neoplasm of prostate ([facility] [address] C61    Hyperlipidemia E78.5    Osteoarthritis of knee M17.9    Hypokalemia E87.6    Abnormal thyroid function test R94.6    Open angle with borderline findings, low risk, 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 GYN ISSUES. Stroke Father      Social History     Tobacco Use    Smoking status: Never    Smokeless tobacco: Never   Vaping Use    Vaping Use: Never used   Substance Use Topics    Alcohol use:  Yes     Alcohol/week: 0.8 standard drinks     Types: 1 Cans of beer per week     Comment: 1-2 per month    Drug use: No     Current Outpatient Medications on File Prior to Visit   Medication Sig Dispense Refill    lisinopriL (PRINIVIL, ZESTRIL) 40 mg tablet TAKE 1 TABLET BY MOUTH EVERY DAY BEFORE A MEAL 90 Tablet 1    amiodarone (CORDARONE) 200 mg tablet TAKE 1/2 TABLET BY MOUTH DAILY      atorvastatin (LIPITOR) 10 mg tablet TAKE 1 TABLET BY MOUTH EVERY DAY       No current facility-administered medications on file prior to visit. No Known Allergies  Review of Systems   Constitutional: Negative. Cardiovascular:  Positive for palpitations. Negative for chest pain. Objective  Visit Vitals  BP (!) 164/81 (BP 1 Location: Left upper arm, BP Patient Position: Sitting, BP Cuff Size: Large adult)   Pulse 62   Temp 97.8 °F (36.6 °C) (Tympanic)   Resp 18   Ht 5' 11\" (1.803 m)   Wt 205 lb (93 kg)   SpO2 99%   BMI 28.59 kg/m²     Physical Exam  Constitutional:       General: He is not in acute distress. Appearance: Normal appearance. He is not ill-appearing. HENT:      Head: Normocephalic and atraumatic. Eyes:      Extraocular Movements: Extraocular movements intact. Conjunctiva/sclera: Conjunctivae normal.   Cardiovascular:      Rate and Rhythm: Normal rate and regular rhythm. Heart sounds: Normal heart sounds. No murmur heard. Pulmonary:      Effort: Pulmonary effort is normal. No respiratory distress. Breath sounds: Normal breath sounds. Musculoskeletal:         General: Normal range of motion. Cervical back: Normal range of motion. Skin:     General: Skin is warm and dry. Neurological:      General: No focal deficit present. Mental Status: He is alert and oriented to person, place, and time. Psychiatric:         Mood and Affect: Mood normal.         Behavior: Behavior normal.         Thought Content: Thought content normal.         Judgment: Judgment normal.        Assessment & Plan    ICD-10-CM ICD-9-CM    1. Chest pain, unspecified type  R07.9 786.50       2. Hypokalemia  E87.6 276.8       3. Abnormal thyroid function test  R94.6 794.5 TSH 3RD GENERATION      T4, FREE        Diagnoses and all orders for this visit:    1. Chest pain, unspecified type  Resolved since ED visit. 2. Hypokalemia  Refill potassium 20mEq BID. Unclear whether patient was taking this as prescribed prior to ED visit. Will need to recheck K level at follow up. 3. Abnormal thyroid function test  -     TSH 3RD GENERATION  -     T4, FREE  Encouraged patient to call other Endocrinology office for sooner availability. Other orders  -     amLODIPine (NORVASC) 10 mg tablet; Take 1 Tablet by mouth daily. -     potassium chloride (Klor-Con M20) 20 mEq tablet; Take 1 Tablet by mouth two (2) times a day for 15 days. Follow-up and Dispositions    Return in about 1 week (around 2/2/2023) for Blood pressure.        Manuel Hong DO

## 2023-01-27 LAB
T4 FREE SERPL-MCNC: 1.88 NG/DL (ref 0.82–1.77)
TSH SERPL DL<=0.005 MIU/L-ACNC: 1.63 UIU/ML (ref 0.45–4.5)

## 2023-01-31 ENCOUNTER — TELEPHONE (OUTPATIENT)
Dept: FAMILY MEDICINE CLINIC | Age: 83
End: 2023-01-31

## 2023-01-31 NOTE — PROGRESS NOTES
Your thyroid function is still abnormal, but it is stable. Please arrange an appointment with Endocrinology as soon as you can.

## 2023-01-31 NOTE — TELEPHONE ENCOUNTER
----- Message from Shayy Landa, DO sent at 1/31/2023  7:48 AM EST -----  Your thyroid function is still abnormal, but it is stable. Please arrange an appointment with Endocrinology as soon as you can.

## 2023-01-31 NOTE — TELEPHONE ENCOUNTER
Patient called and would like a call back from the Clinical Staff regarding his lab work [phone].

## 2023-02-10 ENCOUNTER — OFFICE VISIT (OUTPATIENT)
Dept: FAMILY MEDICINE CLINIC | Age: 83
End: 2023-02-10

## 2023-02-10 VITALS
RESPIRATION RATE: 18 BRPM | BODY MASS INDEX: 28.28 KG/M2 | DIASTOLIC BLOOD PRESSURE: 79 MMHG | TEMPERATURE: 97.5 F | SYSTOLIC BLOOD PRESSURE: 136 MMHG | HEIGHT: 71 IN | WEIGHT: 202 LBS | HEART RATE: 64 BPM | OXYGEN SATURATION: 98 %

## 2023-02-10 DIAGNOSIS — R94.6 ABNORMAL THYROID FUNCTION TEST: ICD-10-CM

## 2023-02-10 DIAGNOSIS — I10 ESSENTIAL (PRIMARY) HYPERTENSION: Primary | ICD-10-CM

## 2023-02-10 DIAGNOSIS — E87.6 HYPOKALEMIA: ICD-10-CM

## 2023-02-10 NOTE — PROGRESS NOTES
Subjective  Chief Complaint   Patient presents with    Follow-up    Hypertension    Labs     HPI:  Shamika Salinas is a 78 y.o. male with medical problems as listed below who presents for blood pressure follow up. HTN: Just taking lisinopril and amlodipine for BP. HCTZ was discontinued due to hypokalemia at last visit and amlodipine dose increased. BP ranging 130-150/70's at home. Patient has started to walk more again in the last week. He reports that he is trying to limit his salt intake. He does like to eat potato chips for a snack. He does not eat out very often. Hypokalemia: As above, HCTZ discontinued. Patient endorses compliance with potassium 20mEq BID. Hyperthyroidism: Patient has an appointment with Endocrinology on 3/7/23. Last TSH/fT4 1.63/1.88 which is slightly improved as compared to labs from September. He denies palpitations, but does feel \"jittery\" when he first wakes up in the morning. As above, he has remained hypokalemic despite potassium repletion. 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    Incomplete rotator cuff tear or rupture of right shoulder, not specified as traumatic M75.111    Age-related macular degeneration H35.30    Atrial fibrillation (HCC) I48.91    Benign neoplasm of colon D12.6    Edema R60.9    Primary malignant neoplasm of prostate ([facility] [address] C61    Hyperlipidemia E78.5    Osteoarthritis of knee M17.9    Hypokalemia E87.6    Abnormal thyroid function test R94.6    Open angle with borderline findings, low risk, 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 GYN ISSUES.      Stroke Father      Social History     Tobacco Use    Smoking status: Never    Smokeless tobacco: Never   Vaping Use    Vaping Use: Never used   Substance Use Topics    Alcohol use: Yes     Alcohol/week: 0.8 standard drinks     Types: 1 Cans of beer per week     Comment: 1-2 per month    Drug use: No     Current Outpatient Medications on File Prior to Visit   Medication Sig Dispense Refill    amLODIPine (NORVASC) 10 mg tablet Take 1 Tablet by mouth daily. 90 Tablet 3    potassium chloride (Klor-Con M20) 20 mEq tablet Take 1 Tablet by mouth two (2) times a day for 15 days. 180 Tablet 1    lisinopriL (PRINIVIL, ZESTRIL) 40 mg tablet TAKE 1 TABLET BY MOUTH EVERY DAY BEFORE A MEAL 90 Tablet 1    amiodarone (CORDARONE) 200 mg tablet TAKE 1/2 TABLET BY MOUTH DAILY      atorvastatin (LIPITOR) 10 mg tablet TAKE 1 TABLET BY MOUTH EVERY DAY       No current facility-administered medications on file prior to visit. No Known Allergies  Review of Systems   Constitutional: Negative. Respiratory: Negative. Cardiovascular: Negative. Objective  Visit Vitals  /79 (BP 1 Location: Right upper arm, BP Patient Position: Sitting, BP Cuff Size: Large adult)   Pulse 64   Temp 97.5 °F (36.4 °C) (Temporal)   Resp 18   Ht 5' 11\" (1.803 m)   Wt 202 lb (91.6 kg)   SpO2 98%   BMI 28.17 kg/m²     Physical Exam  Constitutional:       General: He is not in acute distress. Appearance: Normal appearance. HENT:      Head: Normocephalic and atraumatic. Eyes:      Extraocular Movements: Extraocular movements intact. Conjunctiva/sclera: Conjunctivae normal.   Pulmonary:      Effort: Pulmonary effort is normal. No respiratory distress. Musculoskeletal:      Cervical back: Normal range of motion. Skin:     General: Skin is warm and dry. Neurological:      General: No focal deficit present. Mental Status: He is alert and oriented to person, place, and time. Psychiatric:         Mood and Affect: Mood normal.         Behavior: Behavior normal.         Thought Content: Thought content normal.         Judgment: Judgment normal.        Assessment & Plan    ICD-10-CM ICD-9-CM    1. Essential (primary) hypertension  I10 401.9       2. Hypokalemia  E87.6 276.8       3. Abnormal thyroid function test  R94.6 794.5         Diagnoses and all orders for this visit:    1. Essential (primary) hypertension  BP at goal in office though it sounds as though patient is running slightly above goal at home. No medication changes at this time. 2. Hypokalemia  Patient has had low potassium dating all the way back to 2014. HCTZ discontinued at last visit which can potentially make a difference in potassium level. Low potassium could also be related to thyroid disease. Continue potassium supplement 20mEq BID. Plan to repeat K level at follow up appointment in March. 3. Abnormal thyroid function test  TSH/fT4 slightly improved as compared to 09/2022. Encouraged to keep appointment with Endocrinology on 3/7.        Pamela Lopez DO

## 2023-02-10 NOTE — PROGRESS NOTES
1. Have you been to the ER, urgent care clinic since your last visit? Hospitalized since your last visit? No    2. Have you seen or consulted any other health care providers outside of the [address] [place] [address] since your last visit? Include any pap smears or colon screening.  No    Chief Complaint   Patient presents with    Follow-up    Hypertension    Labs      Visit Vitals  /79 (BP 1 Location: Right upper arm, BP Patient Position: Sitting, BP Cuff Size: Large adult)   Pulse 64   Temp 97.5 °F (36.4 °C) (Temporal)   Resp 18   Ht 5' 11\" (1.803 m)   Wt 202 lb (91.6 kg)   SpO2 98%   BMI 28.17 kg/m²

## 2023-03-07 ENCOUNTER — OFFICE VISIT (OUTPATIENT)
Dept: ENDOCRINOLOGY | Age: 83
End: 2023-03-07

## 2023-03-07 VITALS
OXYGEN SATURATION: 95 % | HEART RATE: 61 BPM | HEIGHT: 71 IN | WEIGHT: 204.2 LBS | SYSTOLIC BLOOD PRESSURE: 143 MMHG | BODY MASS INDEX: 28.59 KG/M2 | TEMPERATURE: 98.1 F | DIASTOLIC BLOOD PRESSURE: 76 MMHG

## 2023-03-07 DIAGNOSIS — R94.6 ABNORMAL THYROID FUNCTION TEST: ICD-10-CM

## 2023-03-07 DIAGNOSIS — I48.11 LONGSTANDING PERSISTENT ATRIAL FIBRILLATION (HCC): Primary | ICD-10-CM

## 2023-03-07 RX ORDER — POTASSIUM CHLORIDE 20 MEQ/1
20 TABLET, EXTENDED RELEASE ORAL 2 TIMES DAILY
COMMUNITY

## 2023-03-07 RX ORDER — METHIMAZOLE 5 MG/1
TABLET ORAL
Qty: 45 TABLET | Refills: 1 | Status: SHIPPED | OUTPATIENT
Start: 2023-03-07

## 2023-03-07 NOTE — PATIENT INSTRUCTIONS
SPECIFIC INSTRUCTIONS BELOW       Start on tapazole 2.5 mg   every other day         -------------PAY ATTENTION TO THESE GENERAL INSTRUCTIONS -----------------      - The medications prescribed at this visit will not be available at pharmacy until 6 pm       - YOUR MED LIST IS NOT UP TO DATE AS SOME CHANGES ARE BEING MADE AFTER THE VISIT - FOLLOW SPECIFIC INSTRUCTIONS  ABOVE     -ANY tests other than blood work, which you opt to do  outside the  [facility] facilities, you are responsible for prior authorizations if  required    - [address] [facility]- PLEASE IGNORE     Results     *Normal results will not be notified by a phone call starting January 1 2021   *If you have an upcoming visit, the results will be discussed at the visit   *Please sign up for MY CHART if you want access to your lab and test results  *Abnormal results which require immediate attention will be notified by phone call   *Abnormal results which do not require immediate assistance will be notified in 1-2 weeks       Refills    -    have your pharmacy send us a refill request . Refills are done max for one year and a visit is a must before refills are extended    Follow up appointments -  highly encourage you to make it when you are checking out. We can accommodate you into the schedule based on your clinical situation, but not for extending refills beyond a year. Labs are important to give refills and is important to get labs before the visit     Phone calls  -  Allow  24 hrs.  for non-urgent calls to be returned  Prior authorization - It may take 2-4 weeks to process  Forms  -  FMLA, DMV etc., will take up to 2 weeks to process  Cancellations - please notify the office 2 days in advance   Samples  - will only be dispensed at visits       If not showing for the appointments and cancelling appointments within 24 hours are kept track of and three  of such situations in  two consecutive years will likely be considered for termination from the practice    -------------------------------------------------------------------------------------------------------------------

## 2023-03-07 NOTE — PROGRESS NOTES
Nury Alfaro is a 78 y.o. male here for   Chief Complaint   Patient presents with    New Patient       1. Have you been to the ER, urgent care clinic since your last visit? Hospitalized since your last visit? [facility]     2. Have you seen or consulted any other health care providers outside of the [address] [place] [address] since your last visit?   Include any pap smears or colon screening.-No

## 2023-03-07 NOTE — PROGRESS NOTES
Sara Patel MD FACE   HISTORY OF PRESENT ILLNESS  Cata Matthews is a 78 y.o. male. HPI    He is referred to me by his pcp for evaluation of abnormal thyroid labs   Patient is a pleasant man accompanied by his wife and son. Wife has a small cute Journal  where she has kept track of   medical info  for more than 20 years  about her 's illnesses and the medications he started on. Patient has rolling tremors  and  he wants to know If thyroid is causing this issue . He is not a good historian  I gathered info from [facility], pcp notes  and  help of his wife     Patient has longstanding history of atrial fibrillation. He has been taking amiodarone for many number of years, prescribed by Dr. Sofie Bunn. Patient is in sinus rhythm per his last notes from September 2022  Patient had multiple times thyroid function test done by his PCP and PCP noted that there is elevation of free T4 and normal TSH values happening since March 2022 onwards     Patient had ER visit for atypical chest pain but was found to have hypokalemia        Past Medical History:   Diagnosis Date    Arrhythmia 09/03/2014    RECENT DX OF ATRIAL FIBRILLATION    Arthritis     Cancer ([facility] [address]     PROSTATE. Decreased hearing 08/21/2020    Ear problem 08/21/2020    High cholesterol 08/21/2020    HTN (hypertension) 08/21/2020    Hypertension     CONTROLLED BY MEDS. Other ill-defined conditions(799.89)     Other ill-defined conditions(799.89)     hypercholesterolemia       Social History     Socioeconomic History    Marital status:      Spouse name: Not on file    Number of children: Not on file    Years of education: Not on file    Highest education level: Not on file   Occupational History    Not on file   Tobacco Use    Smoking status: Never    Smokeless tobacco: Never   Vaping Use    Vaping Use: Never used   Substance and Sexual Activity    Alcohol use:  Yes Alcohol/week: 0.8 standard drinks     Types: 1 Cans of beer per week     Comment: 1-2 per month    Drug use: No    Sexual activity: Yes     Partners: Female   Other Topics Concern    Not on file   Social History Narrative    Not on file     Social Determinants of Health     Financial Resource Strain: Low Risk     Difficulty of Paying Living Expenses: Not hard at all   Food Insecurity: No Food Insecurity    Worried About [address] in the Last Year: Never true    [address] in the Last Year: Never true   Transportation Needs: No Transportation Needs    Lack of Transportation (Medical): No    Lack of Transportation (Non-Medical): No   Physical Activity: Inactive    Days of Exercise per Week: 0 days    Minutes of Exercise per Session: 0 min   Stress: No Stress Concern Present    Feeling of Stress : Not at all   Social Connections: Socially Integrated    Frequency of Communication with Friends and Family: Three times a week    Frequency of Social Gatherings with Friends and Family: Three times a week    Attends Shinto Services: 1 to 4 times per year    Active Member of Clubs or Organizations: Yes    Attends Club or Organization Meetings: 1 to 4 times per year    Marital Status:    Intimate Partner Violence: Not At Risk    Fear of Current or Ex-Partner: No    Emotionally Abused: No    Physically Abused: No    Sexually Abused: No   Housing Stability: Unknown    Unable to Pay for Housing in the Last Year: No    Number of [place] in the Last Year: Not on file    Unstable Housing in the Last Year: No       Family History   Problem Relation Age of Onset    Heart Disease Mother     Cancer Mother         NOT SURE. GYN ISSUES. Stroke Father      Review of Systems   As above       Physical Exam   Constitutional:  oriented to person, place, and time. Neck:  Neck supple. No thyromegaly present.    Cardiovascular: Normal rate  Pulmonary/Chest: Breath sounds normal.   Psychiatric:  normal mood and affect. ASSESSMENT and PLAN    Abnormal TFTs :  higher free T4 , but normal TSH   Called pharmacy and inquired  -   2014 -  notes indicated   warfarin use ( start of a-fib likely )   Feb 2018  - amiodarone  has been consistent likely    Reviewed old [facility] records -  patient had normal labs until march 2021 . March 2022 onwards, seems like the change started   Sept 2022 -  the thyrodi gland is normal , so there is no background nodular goiter   Pt is not symptomatic of hyperthyroid state ( ? Apathetic given the age possible )   Could be Amiodarone related and it is a needed medication for atrial fib , and cannot be changed or stopped   So, only one option is to try ATD at low dose . Start  on tapazole  5 mg every other day   Discussed it with family      2. Persistent atrial fib - followed by Dr. Jerman Bolton     3.  Hypokalemia  - etiology  seems low K diet       Reviewed results with patient and discussed the labs being ordered today/bnv  Patient voiced understanding of plan of care

## 2023-03-07 NOTE — LETTER
3/12/2023    Patient: Grayson Emmanuel   YOB: 1940   Date of Visit: 3/7/2023     Suellen Henderson DO  [address] [facility] [address] [facility]  Via In Basket    Dear Suellen Henderson DO,      Thank you for referring Mr. Ashu Orona to [address] [place] [address] for evaluation. My notes for this consultation are attached. If you have questions, please do not hesitate to call me. I look forward to following your patient along with you.       Sincerely,    Flavia Cisneros MD

## 2023-04-07 ENCOUNTER — OFFICE VISIT (OUTPATIENT)
Dept: FAMILY MEDICINE CLINIC | Age: 83
End: 2023-04-07

## 2023-04-22 DIAGNOSIS — I48.11 LONGSTANDING PERSISTENT ATRIAL FIBRILLATION (HCC): Primary | ICD-10-CM

## 2023-04-22 DIAGNOSIS — R94.6 ABNORMAL THYROID FUNCTION TEST: ICD-10-CM

## 2023-04-24 DIAGNOSIS — R94.6 ABNORMAL THYROID FUNCTION TEST: ICD-10-CM

## 2023-04-24 DIAGNOSIS — I48.11 LONGSTANDING PERSISTENT ATRIAL FIBRILLATION (HCC): Primary | ICD-10-CM

## 2023-05-03 ENCOUNTER — OFFICE VISIT (OUTPATIENT)
Dept: ENT CLINIC | Age: 83
End: 2023-05-03

## 2023-05-03 VITALS
BODY MASS INDEX: 28.42 KG/M2 | HEIGHT: 71 IN | OXYGEN SATURATION: 96 % | SYSTOLIC BLOOD PRESSURE: 134 MMHG | WEIGHT: 203 LBS | DIASTOLIC BLOOD PRESSURE: 82 MMHG | HEART RATE: 76 BPM

## 2023-05-03 DIAGNOSIS — H61.23 BILATERAL IMPACTED CERUMEN: ICD-10-CM

## 2023-05-03 DIAGNOSIS — H92.01 OTALGIA, RIGHT: ICD-10-CM

## 2023-05-03 DIAGNOSIS — H90.3 SENSORINEURAL HEARING LOSS (SNHL), BILATERAL: Primary | ICD-10-CM

## 2023-05-03 PROCEDURE — V5014 HEARING AID REPAIR/MODIFYING: HCPCS | Performed by: AUDIOLOGIST

## 2023-06-16 ENCOUNTER — TELEPHONE (OUTPATIENT)
Age: 83
End: 2023-06-16

## 2023-08-03 ENCOUNTER — OFFICE VISIT (OUTPATIENT)
Age: 83
End: 2023-08-03

## 2023-08-03 ENCOUNTER — PROCEDURE VISIT (OUTPATIENT)
Age: 83
End: 2023-08-03

## 2023-08-03 DIAGNOSIS — H90.3 SENSORINEURAL HEARING LOSS, BILATERAL: Primary | ICD-10-CM

## 2023-08-03 NOTE — PROGRESS NOTES
Patient name: Maggie Johnson   : 1940   MRN: [id number]   Appointment type: Audiogram    Patient is a very pleasant 78 y.o. male  referred by Megan Elena DNP for an audiological evaluation. Patient has known bilateral sensorineural hearing loss and wears Nathaly Alfred Muse i1600 LYDIA hearing aids (fit through this office by Dr. Archana Stiles in 2016). He was last seen for hearing aid check on 5-3-2023. At that time, was noted that left hearing aid was not functioning. Replaced microphone cover from clinic stock and sound output improved. Had discussed with patient that his last audiogram is dated 2020 through Dr. Nimisha See office [facility]); had recommended updated audiogram with hearing aid reprogramming if necessary. Otoscopy: canals clear, tympanic membrane intact bilaterally  Tympanometry: RE Type A, normal  LE unable to obtain    SRT: RE Speech Reception Threshold (SRT) was obtained at 60 dBHL   SRT: LE Speech Reception Threshold (SRT) was obtained at 65 dBHL    WRS: RE Poor in quiet when words were presented at 95 dBHL. WRS: LE Poor in quiet when words were presented at 95 dBHL. Binaural WRS (80 dBHL): 64%    Pure tone audiometry:   RE: Moderate sloping to severe sensorineural hearing loss  LE: Moderate-severe sloping to severe sensorineural hearing loss    sensorineural hearing loss bilaterally    Plan:   Refer to ENT for medical clearance for hearing aids. Hearing aid check with reprogramming today as scheduled. Should consider referral for cochlear implant evaluation given patient's word recognition (Dr. Omid Blue). Briefly discussed this with patient; he is aware of this option and will consider. Repeat audiogram 1 year or sooner if change is noted.     Juni Durand   Doctor of Audiology

## 2023-08-03 NOTE — PROGRESS NOTES
Pt. Name: Sandro Malone   : 1940   MRN: [id number]     Appointment type: Hearing Aid Check    : Domenico Garner: [email or web address]   S/N R: [id number]   S/N L: [id number]  Repair warranty: 2018  L/D warranty: 2018    Patient in for a progress check with Francia Mario i1600 hearing aids. Patient was last seen for hearing aid check on 5-3-2023; at that time left hearing aid was found to be nonfunctioning. Replaced left speaker and sound output improved. Also noted that patient's last hearing test was dated 2020; recommended updated audiogram. Patient had hearing test today and presents for hearing aid reprogramming as thresholds have changed. Put today's audiogram into [facility] and reprogrammed hearing aids. Re-ran feedback cancellation. Patient reported improvement in sound quality. Did note that insertion of left hearing aid was difficult for patient; suggested custom earmold similar to the right side. Patient is interested in this option. After informed verbal consent, earmold impression of the left ear was taken without incident. Will send impression and order form to [facility]. Patient was quoted $75.00 for earmold impression and $50.00 for earmold; he will pay upon pickup. Did discuss that due to patient's worsening hearing thresholds as well as noted processing difficulties, he should consider a cochlear implant. Briefly reviewed differences between hearing aid and cochlear implant as well as candidacy for both. Patient stated he will consider this option. Earmold PO: [phone]  Tracking # (UPS): [id number]    Plan: Patient to RTC in 2 weeks for earmold fitting. Payment of $125.00 will be due at this appointment. (Ear impression and earmold were both billed to today's visit.) At that time, provide communication strategies handout.      Juni Lieberman   Doctor of Audiology

## 2023-08-28 ENCOUNTER — PROCEDURE VISIT (OUTPATIENT)
Age: 83
End: 2023-08-28

## 2023-08-28 DIAGNOSIS — H90.3 SENSORINEURAL HEARING LOSS, BILATERAL: Primary | ICD-10-CM

## 2023-08-28 NOTE — PROGRESS NOTES
Pt. Name: Lizzy Campuzano   : 1940   MRN: [id number]    Appointment type: Izabela Parcel Fitting    : Nayeli Dunne: Muse i1600  S/N R: [id number]    S/N L: [id number]  Repair warranty: **EXP** 2018  L/D warranty: **EXP** 2018    Earmold information:  S/N R:  unavailable    S/N L: [id number]  Remake warranty (L only) 2023    Patient in today to  his custom earmold for the left ear. Patient was last seen on 8-3-2023 for updated audiogram and hearing aid check. At that time, patient had reported that it was difficult to insert left hearing aid. Offered left custom mold to match the one for patient's right hearing aid; patient agreed. Earmold impression was taken at that time without incident. Patient reports today for earmold fitting. He reports that he is hearing better and sound is more balanced following reprogramming at last appointment. Earmold appears to fit well. Updated acoustics in [email or web address] and re-ran feedback canceller to reflect addition of earmold. No other programming changes made today. Patient instructed on insertion of earmolds as well as maintenance and care of the earmolds. Patient informed of the 80 remake period (see note). Patient paid in full for earmold impression and earmold today. Earmold information:  S/N L: [id number]  Remake warranty: 2023    Note: I did discuss with patient that I will be leaving the practice in October; reassured that we are currently working on finding locum audiology/temporary staff to provide continuity of care until replacement audiologist can be hired. Also discussed that if a replacement cannot be found, patients will be provided with a list of local practices for audiology services. Plan: Patient to RTC for regularly scheduled HACs.  Patient aware to call and request earlier appointment for earmold remake if he experiences issues with discomfort, fit, etc.     Kathlynn Bumpers, AuD   Doctor of

## 2023-08-29 LAB
T4 FREE SERPL-MCNC: 1.61 NG/DL (ref 0.82–1.77)
TSH SERPL DL<=0.005 MIU/L-ACNC: 2.27 UIU/ML (ref 0.45–4.5)

## 2023-08-31 RX ORDER — METHIMAZOLE 5 MG/1
TABLET ORAL
Qty: 45 TABLET | Refills: 1 | Status: SHIPPED | OUTPATIENT
Start: 2023-08-31

## 2023-09-11 ENCOUNTER — OFFICE VISIT (OUTPATIENT)
Age: 83
End: 2023-09-11
Payer: MEDICARE

## 2023-09-11 VITALS
HEART RATE: 75 BPM | OXYGEN SATURATION: 97 % | TEMPERATURE: 97 F | DIASTOLIC BLOOD PRESSURE: 72 MMHG | HEIGHT: 71 IN | WEIGHT: 210.2 LBS | RESPIRATION RATE: 16 BRPM | BODY MASS INDEX: 29.43 KG/M2 | SYSTOLIC BLOOD PRESSURE: 129 MMHG

## 2023-09-11 DIAGNOSIS — E05.90 HYPERTHYROIDISM: Primary | ICD-10-CM

## 2023-09-11 PROCEDURE — 3074F SYST BP LT 130 MM HG: CPT | Performed by: INTERNAL MEDICINE

## 2023-09-11 PROCEDURE — G8427 DOCREV CUR MEDS BY ELIG CLIN: HCPCS | Performed by: INTERNAL MEDICINE

## 2023-09-11 PROCEDURE — 3078F DIAST BP <80 MM HG: CPT | Performed by: INTERNAL MEDICINE

## 2023-09-11 PROCEDURE — 99214 OFFICE O/P EST MOD 30 MIN: CPT | Performed by: INTERNAL MEDICINE

## 2023-09-11 PROCEDURE — G8419 CALC BMI OUT NRM PARAM NOF/U: HCPCS | Performed by: INTERNAL MEDICINE

## 2023-09-11 PROCEDURE — 1123F ACP DISCUSS/DSCN MKR DOCD: CPT | Performed by: INTERNAL MEDICINE

## 2023-09-11 PROCEDURE — 1036F TOBACCO NON-USER: CPT | Performed by: INTERNAL MEDICINE

## 2023-09-11 RX ORDER — METHIMAZOLE 5 MG/1
5 TABLET ORAL EVERY OTHER DAY
Qty: 45 TABLET | Refills: 1 | Status: SHIPPED | OUTPATIENT
Start: 2023-09-11

## 2023-09-11 NOTE — PROGRESS NOTES
Floresita Murillo MD FACE    HISTORY OF PRESENT ILLNESS   Arianne Cervantes is a 78 y.o. male. HPI      He is referred to me by his pcp for evaluation of abnormal thyroid labs ,  Last visit  in  March 2023     Today is follow up visit -   He is c/o pedal edema   He has diuretics   and  his  K is fluctuant     March 2023   - initial visit    Patient is a pleasant man accompanied by his wife and son. Wife has a small cute Journal  where she has kept track of   medical info  for more than 20 years  about her 's illnesses and the medications he started on. Patient has rolling tremors  and  he wants to know If thyroid is causing this issue . He is not a good historian   I gathered info from [facility], pcp notes  and  help of his wife     Patient has longstanding history of atrial fibrillation. He has been taking amiodarone for many number of years, prescribed by Dr. Tricia Vee. Patient is in sinus rhythm per his last notes from September 2022   Patient had multiple times thyroid function test done by his PCP and PCP noted that there is elevation of free T4 and normal TSH values happening since March 2022 onwards      Patient had ER visit for atypical chest pain but was found to have hypokalemia                Review of Systems    As above          Physical Exam    Constitutional:  oriented to person, place, and time. Neck:  Neck supple. No thyromegaly present. Leg edema           Labs    Thyroid    Lab Results   Component Value Date    TSH 2.270 08/28/2023    T4FREE 1.61 08/28/2023             ASSESSMENT and PLAN       Abnormal TFTs :  higher free T4 , but normal TSH    Called pharmacy and inquired  -    2014 -  notes indicated   warfarin use ( start of a-fib likely )    Feb 2018  - amiodarone  has been consistent likely     Reviewed old [facility] records -  patient had normal labs until march 2021 .  March 2022 onwards, seems like the

## 2023-09-11 NOTE — PATIENT INSTRUCTIONS
SPECIFIC INSTRUCTIONS BELOW     Stay on tapazole  5 mg every other day         -------------PAY ATTENTION TO THESE GENERAL INSTRUCTIONS -----------------      - The medications prescribed at this visit will not be available at pharmacy until 6 pm       - YOUR MED LIST IS NOT UP TO DATE AS SOME CHANGES ARE BEING MADE AFTER THE VISIT - FOLLOW SPECIFIC INSTRUCTIONS  ABOVE     -ANY tests other than blood work, which you opt to do  outside the  [facility] imaging facilities, you are responsible for prior authorizations if  required    - [address] [place] [address] Drive AVS- PLEASE IGNORE     Results     *Normal results will not be notified by a phone call starting January 1 2021   *If you have an upcoming visit, the results will be discussed at the visit   *Please sign up for MY CHART if you want access to your lab and test results  *Abnormal results which require immediate attention will be notified by phone call   *Abnormal results which do not require immediate assistance will be notified in 1-2 weeks       Refills    -    have your pharmacy send us a refill request . Refills are done max for one year and a visit is a must before refills are extended    Follow up appointments -  highly encourage you to make it when you are checking out. We can accommodate you into the schedule based on your clinical situation, but not for extending refills beyond a year. Labs are important to give refills and is important to get labs before the visit     Phone calls  -  Allow  24 hrs.  for non-urgent calls to be returned  Prior authorization - It may take 2-4 weeks to process  Forms  -  FMLA, DMV etc., will take up to 2 weeks to process  Cancellations - please notify the office 2 days in advance   Samples  - will only be dispensed at visits       If not showing for the appointments and cancelling appointments within 24 hours are kept track of and three  of such situations in  two consecutive years will likely

## 2023-09-12 ENCOUNTER — OFFICE VISIT (OUTPATIENT)
Age: 83
End: 2023-09-12
Payer: MEDICARE

## 2023-09-12 VITALS
SYSTOLIC BLOOD PRESSURE: 126 MMHG | BODY MASS INDEX: 29.4 KG/M2 | DIASTOLIC BLOOD PRESSURE: 84 MMHG | HEART RATE: 81 BPM | WEIGHT: 210 LBS | RESPIRATION RATE: 16 BRPM | HEIGHT: 71 IN

## 2023-09-12 DIAGNOSIS — L60.3 ONYCHODYSTROPHY: Primary | ICD-10-CM

## 2023-09-12 PROCEDURE — 3074F SYST BP LT 130 MM HG: CPT | Performed by: PODIATRIST

## 2023-09-12 PROCEDURE — 1123F ACP DISCUSS/DSCN MKR DOCD: CPT | Performed by: PODIATRIST

## 2023-09-12 PROCEDURE — G8427 DOCREV CUR MEDS BY ELIG CLIN: HCPCS | Performed by: PODIATRIST

## 2023-09-12 PROCEDURE — 1036F TOBACCO NON-USER: CPT | Performed by: PODIATRIST

## 2023-09-12 PROCEDURE — 99203 OFFICE O/P NEW LOW 30 MIN: CPT | Performed by: PODIATRIST

## 2023-09-12 PROCEDURE — 3078F DIAST BP <80 MM HG: CPT | Performed by: PODIATRIST

## 2023-09-12 PROCEDURE — G8419 CALC BMI OUT NRM PARAM NOF/U: HCPCS | Performed by: PODIATRIST

## 2023-10-16 ENCOUNTER — OFFICE VISIT (OUTPATIENT)
Facility: CLINIC | Age: 83
End: 2023-10-16
Payer: MEDICARE

## 2023-10-16 VITALS
TEMPERATURE: 97.7 F | BODY MASS INDEX: 29.12 KG/M2 | DIASTOLIC BLOOD PRESSURE: 80 MMHG | HEART RATE: 72 BPM | SYSTOLIC BLOOD PRESSURE: 130 MMHG | HEIGHT: 71 IN | WEIGHT: 208 LBS | OXYGEN SATURATION: 98 % | RESPIRATION RATE: 18 BRPM

## 2023-10-16 DIAGNOSIS — Z23 INFLUENZA VACCINE NEEDED: ICD-10-CM

## 2023-10-16 DIAGNOSIS — E05.90 HYPERTHYROIDISM: ICD-10-CM

## 2023-10-16 DIAGNOSIS — R60.9 EDEMA, UNSPECIFIED TYPE: ICD-10-CM

## 2023-10-16 DIAGNOSIS — E87.6 HYPOKALEMIA: ICD-10-CM

## 2023-10-16 DIAGNOSIS — I10 ESSENTIAL (PRIMARY) HYPERTENSION: ICD-10-CM

## 2023-10-16 DIAGNOSIS — I48.19 PERSISTENT ATRIAL FIBRILLATION (HCC): Primary | ICD-10-CM

## 2023-10-16 PROBLEM — R94.6 ABNORMAL THYROID FUNCTION TEST: Status: RESOLVED | Noted: 2022-09-26 | Resolved: 2023-10-16

## 2023-10-16 PROCEDURE — 1123F ACP DISCUSS/DSCN MKR DOCD: CPT | Performed by: FAMILY MEDICINE

## 2023-10-16 PROCEDURE — G0008 ADMIN INFLUENZA VIRUS VAC: HCPCS | Performed by: FAMILY MEDICINE

## 2023-10-16 PROCEDURE — 90694 VACC AIIV4 NO PRSRV 0.5ML IM: CPT | Performed by: FAMILY MEDICINE

## 2023-10-16 PROCEDURE — G8484 FLU IMMUNIZE NO ADMIN: HCPCS | Performed by: FAMILY MEDICINE

## 2023-10-16 PROCEDURE — 99214 OFFICE O/P EST MOD 30 MIN: CPT | Performed by: FAMILY MEDICINE

## 2023-10-16 PROCEDURE — G8427 DOCREV CUR MEDS BY ELIG CLIN: HCPCS | Performed by: FAMILY MEDICINE

## 2023-10-16 PROCEDURE — 1036F TOBACCO NON-USER: CPT | Performed by: FAMILY MEDICINE

## 2023-10-16 PROCEDURE — 3079F DIAST BP 80-89 MM HG: CPT | Performed by: FAMILY MEDICINE

## 2023-10-16 PROCEDURE — G8419 CALC BMI OUT NRM PARAM NOF/U: HCPCS | Performed by: FAMILY MEDICINE

## 2023-10-16 PROCEDURE — 3075F SYST BP GE 130 - 139MM HG: CPT | Performed by: FAMILY MEDICINE

## 2023-10-16 SDOH — ECONOMIC STABILITY: HOUSING INSECURITY
IN THE LAST 12 MONTHS, WAS THERE A TIME WHEN YOU DID NOT HAVE A STEADY PLACE TO SLEEP OR SLEPT IN A SHELTER (INCLUDING NOW)?: NO

## 2023-10-16 SDOH — ECONOMIC STABILITY: INCOME INSECURITY: IN THE LAST 12 MONTHS, WAS THERE A TIME WHEN YOU WERE NOT ABLE TO PAY THE MORTGAGE OR RENT ON TIME?: NO

## 2023-10-16 SDOH — ECONOMIC STABILITY: HOUSING INSECURITY: IN THE LAST 12 MONTHS, HOW MANY PLACES HAVE YOU LIVED?: 1

## 2023-10-16 NOTE — PROGRESS NOTES
Subjective  Chief Complaint   Patient presents with    form completion    Foot Swelling     HPI:  Ashlee Pace is a 78 y.o. male with medical problems as listed below who presents for form completion and foot swelling. Hyperthyroidism: Taking methimazole 5mg every other day. Most recent thyroid labs wnl. Managed by     HLD: Taking atorvastatin. HTN: Amlodipine was discontinued due to swelling in lower extremities. Atrial fibrillation: Taking amiodarone and was just started on Eliquis 2 weeks ago. Sees Dr. Sung Siegel on 11/13. Hypokalemia: Taking potassium supplement but sometimes only once daily because it causes some abdominal cramping. Labs recently drawn by Cardiology (last week). Bilateral foot edema: Feet are normal in the morning, but swell up throughout the day. Compression stockings are painful. Dr. Sung Siegel is manageing. Patient Active Problem List   Diagnosis    Incomplete rotator cuff tear or rupture of right shoulder, not specified as traumatic    Decreased hearing    Osteoarthritis of hip    Osteoarthritis of right shoulder    Essential (primary) hypertension    Age-related macular degeneration    Atrial fibrillation (HCC)    Benign neoplasm of colon    Edema    History of prostate cancer    Hyperlipidemia    Osteoarthritis of knee    Hypokalemia    Open angle with borderline findings, low risk, bilateral    Hyperthyroidism     Family History   Problem Relation Age of Onset    Cancer Mother         NOT SURE. GYN ISSUES. Heart Disease Mother     Stroke Father       Social History     Tobacco Use    Smoking status: Never    Smokeless tobacco: Never   Substance Use Topics    Alcohol use:  Yes     Alcohol/week: 0.8 standard drinks of alcohol    Drug use: No     Current Outpatient Medications on File Prior to Visit   Medication Sig Dispense Refill    apixaban (ELIQUIS) 5 MG TABS tablet Take 1 tablet by mouth 2 times daily      methIMAzole (TAPAZOLE) 5 MG tablet Take 1 tablet by mouth

## 2023-11-03 ENCOUNTER — OFFICE VISIT (OUTPATIENT)
Age: 83
End: 2023-11-03
Payer: MEDICARE

## 2023-11-03 VITALS
OXYGEN SATURATION: 99 % | SYSTOLIC BLOOD PRESSURE: 132 MMHG | DIASTOLIC BLOOD PRESSURE: 78 MMHG | HEART RATE: 88 BPM | HEIGHT: 71 IN | BODY MASS INDEX: 29.12 KG/M2 | WEIGHT: 208 LBS

## 2023-11-03 DIAGNOSIS — H90.3 SENSORINEURAL HEARING LOSS, BILATERAL: Primary | ICD-10-CM

## 2023-11-03 PROCEDURE — 99213 OFFICE O/P EST LOW 20 MIN: CPT | Performed by: NURSE PRACTITIONER

## 2023-11-03 PROCEDURE — G8427 DOCREV CUR MEDS BY ELIG CLIN: HCPCS | Performed by: NURSE PRACTITIONER

## 2023-11-03 PROCEDURE — G8419 CALC BMI OUT NRM PARAM NOF/U: HCPCS | Performed by: NURSE PRACTITIONER

## 2023-11-03 PROCEDURE — 1036F TOBACCO NON-USER: CPT | Performed by: NURSE PRACTITIONER

## 2023-11-03 PROCEDURE — 3078F DIAST BP <80 MM HG: CPT | Performed by: NURSE PRACTITIONER

## 2023-11-03 PROCEDURE — 3075F SYST BP GE 130 - 139MM HG: CPT | Performed by: NURSE PRACTITIONER

## 2023-11-03 PROCEDURE — G8484 FLU IMMUNIZE NO ADMIN: HCPCS | Performed by: NURSE PRACTITIONER

## 2023-11-03 PROCEDURE — 1123F ACP DISCUSS/DSCN MKR DOCD: CPT | Performed by: NURSE PRACTITIONER

## 2023-11-03 NOTE — PROGRESS NOTES
Progress  Notes by Teresa Arias NP at 05/03/23 1320                    Author: Teresa Arias NP  Service: --  Author Type: Nurse Practitioner       Filed: 05/03/23 1408  Encounter Date: 5/3/2023  Status: Signed                           Subjective:      Thuan King    78 y.o.    1940       Followup Visit   This is a 78 y.o. male who was last seen in the office on 8/17/2021 with Dr. Juliette Bai, he has known SNHL and wears hearing aids, and a history of cerumen impaction. He is here today to discuss issues  with his ears. He is having some otalgia in the right ear, and states it has gotten worse over the past week or week and a half. He states it hurts al of the time whether or not his hearing aid is in. He states he needs his hearing aids checked, he states  he only has one that is working, and he is having turn his head in order to be able to hear. He wants to see what can be done to get his hearing aids fixed, or if he will need new aids. 11/3/2023- Mr Tong Wall is here today for sic month follow up for possible ear cleaning. Denies any new issues with hearing, he does state that his wax traps have been becoming clogged frequently. Denies any other symptoms at this time. Review of Systems   Review of Systems    Constitutional: Negative. HENT:  Positive for ear pain and hearing loss . Negative for tinnitus. Eyes: Negative. Respiratory: Negative. Cardiovascular: Negative. Gastrointestinal: Negative. Genitourinary: Negative. Musculoskeletal: Negative. Skin: Negative. Neurological: Negative. Endo/Heme/Allergies: Negative. Psychiatric/Behavioral: Negative.                Past Medical History:       Diagnosis                                                                                    Prior to Admission medications            Medication           potassium chloride (K-DUR, KLOR-CON M20) 20 mEq tablet           amLODIPine (NORVASC) 10 mg tablet

## 2023-11-09 ENCOUNTER — OFFICE VISIT (OUTPATIENT)
Age: 83
End: 2023-11-09
Payer: MEDICARE

## 2023-11-09 VITALS
HEART RATE: 77 BPM | SYSTOLIC BLOOD PRESSURE: 132 MMHG | TEMPERATURE: 98.2 F | OXYGEN SATURATION: 98 % | DIASTOLIC BLOOD PRESSURE: 80 MMHG

## 2023-11-09 DIAGNOSIS — B35.1 ONYCHOMYCOSIS: Primary | ICD-10-CM

## 2023-11-09 DIAGNOSIS — L85.9 HYPERKERATOSIS: ICD-10-CM

## 2023-11-09 PROCEDURE — 99213 OFFICE O/P EST LOW 20 MIN: CPT | Performed by: PODIATRIST

## 2023-11-09 PROCEDURE — 1123F ACP DISCUSS/DSCN MKR DOCD: CPT | Performed by: PODIATRIST

## 2023-11-09 PROCEDURE — 3078F DIAST BP <80 MM HG: CPT | Performed by: PODIATRIST

## 2023-11-09 PROCEDURE — 1036F TOBACCO NON-USER: CPT | Performed by: PODIATRIST

## 2023-11-09 PROCEDURE — G8484 FLU IMMUNIZE NO ADMIN: HCPCS | Performed by: PODIATRIST

## 2023-11-09 PROCEDURE — 3074F SYST BP LT 130 MM HG: CPT | Performed by: PODIATRIST

## 2023-11-09 PROCEDURE — G8419 CALC BMI OUT NRM PARAM NOF/U: HCPCS | Performed by: PODIATRIST

## 2023-11-09 PROCEDURE — G8427 DOCREV CUR MEDS BY ELIG CLIN: HCPCS | Performed by: PODIATRIST

## 2023-11-10 NOTE — PROGRESS NOTES
[facility]  [place] [facility]      Subjective:         Patient is a 78 y.o. male who is being seen as a returning pt for thick/discolored toenails and painful calluses. Denies any pain or trauma. Denies any recent diagnostic testing to confirm a diagnosis. Denies any systemic signs of infx. Denies any other LE complaints. Past Medical History:   Diagnosis Date    Arrhythmia 09/03/2014    RECENT DX OF ATRIAL FIBRILLATION    Arthritis     Cancer ([address])     PROSTATE. Decreased hearing 08/21/2020    Ear problem 08/21/2020    High cholesterol 08/21/2020    HTN (hypertension) 08/21/2020    Hypertension     CONTROLLED BY MEDS. Other ill-defined conditions(799.89)     hypercholesterolemia    Other ill-defined conditions(799.89)      Past Surgical History:   Procedure Laterality Date    APPENDECTOMY      CATARACT REMOVAL      both eyes    HEENT      repair of left eye detached retina    PROSTATECTOMY  05/08/2002    TOTAL HIP ARTHROPLASTY Right     TOTAL KNEE ARTHROPLASTY Left        Family History   Problem Relation Age of Onset    Cancer Mother         NOT SURE. GYN ISSUES. Heart Disease Mother     Stroke Father       Social History     Tobacco Use    Smoking status: Never    Smokeless tobacco: Never   Substance Use Topics    Alcohol use: Yes     Alcohol/week: 0.8 standard drinks of alcohol     No Known Allergies  Prior to Admission medications    Medication Sig Start Date End Date Taking?  Authorizing Provider   apixaban (ELIQUIS) 5 MG TABS tablet Take 1 tablet by mouth 2 times daily   Yes ProviderBony MD   methIMAzole (TAPAZOLE) 5 MG tablet Take 1 tablet by mouth every other day 9/11/23  Yes Irasema Connelly MD   amiodarone (CORDARONE) 200 MG tablet TAKE 1/2 TABLET BY MOUTH DAILY 3/8/21  Yes Automatic Reconciliation, Ar   atorvastatin (LIPITOR) 10 MG tablet TAKE 1 TABLET BY MOUTH EVERY DAY 1/15/21  Yes Automatic Reconciliation, Ar   lisinopril (PRINIVIL;ZESTRIL) 40 MG

## 2024-01-11 DIAGNOSIS — I10 ESSENTIAL (PRIMARY) HYPERTENSION: ICD-10-CM

## 2024-01-12 RX ORDER — AMLODIPINE BESYLATE 5 MG/1
5 TABLET ORAL DAILY
Qty: 90 TABLET | Refills: 3 | Status: SHIPPED | OUTPATIENT
Start: 2024-01-12

## 2024-01-12 RX ORDER — POTASSIUM CHLORIDE 1500 MG/1
TABLET, EXTENDED RELEASE ORAL
Qty: 180 TABLET | Refills: 1 | Status: SHIPPED | OUTPATIENT
Start: 2024-01-12

## 2024-01-12 NOTE — TELEPHONE ENCOUNTER
Requested Prescriptions     Pending Prescriptions Disp Refills    KLOR-CON M20 20 MEQ extended release tablet [Pharmacy Med Name: KLOR-CON M20 TABLET] 180 tablet 1     Sig: TAKE 1 TABLET BY MOUTH TWO (2) TIMES A DAY FOR 15 DAYS.    amLODIPine (NORVASC) 5 MG tablet [Pharmacy Med Name: AMLODIPINE BESYLATE 5 MG TAB] 90 tablet 3     Sig: TAKE 1 TABLET BY MOUTH EVERY DAY

## 2024-01-16 ENCOUNTER — OFFICE VISIT (OUTPATIENT)
Facility: CLINIC | Age: 84
End: 2024-01-16

## 2024-01-16 VITALS
HEIGHT: 71 IN | RESPIRATION RATE: 18 BRPM | WEIGHT: 206 LBS | TEMPERATURE: 97.5 F | DIASTOLIC BLOOD PRESSURE: 74 MMHG | SYSTOLIC BLOOD PRESSURE: 139 MMHG | OXYGEN SATURATION: 97 % | HEART RATE: 88 BPM | BODY MASS INDEX: 28.84 KG/M2

## 2024-01-16 DIAGNOSIS — R60.9 EDEMA, UNSPECIFIED TYPE: ICD-10-CM

## 2024-01-16 DIAGNOSIS — E78.5 HYPERLIPIDEMIA, UNSPECIFIED HYPERLIPIDEMIA TYPE: ICD-10-CM

## 2024-01-16 DIAGNOSIS — I10 ESSENTIAL (PRIMARY) HYPERTENSION: Primary | ICD-10-CM

## 2024-01-16 DIAGNOSIS — M15.9 PRIMARY OSTEOARTHRITIS INVOLVING MULTIPLE JOINTS: ICD-10-CM

## 2024-01-16 DIAGNOSIS — E05.90 HYPERTHYROIDISM: ICD-10-CM

## 2024-01-16 DIAGNOSIS — H90.3 SENSORINEURAL HEARING LOSS (SNHL) OF BOTH EARS: ICD-10-CM

## 2024-01-16 DIAGNOSIS — E87.6 HYPOKALEMIA: ICD-10-CM

## 2024-01-16 DIAGNOSIS — I48.19 PERSISTENT ATRIAL FIBRILLATION (HCC): ICD-10-CM

## 2024-01-16 PROBLEM — M17.9 OSTEOARTHRITIS OF KNEE: Status: RESOLVED | Noted: 2022-03-22 | Resolved: 2024-01-16

## 2024-01-16 PROBLEM — M15.0 PRIMARY OSTEOARTHRITIS INVOLVING MULTIPLE JOINTS: Status: ACTIVE | Noted: 2022-01-25

## 2024-01-16 RX ORDER — LISINOPRIL 40 MG/1
40 TABLET ORAL DAILY
Qty: 90 TABLET | Refills: 3 | Status: SHIPPED | OUTPATIENT
Start: 2024-01-16

## 2024-01-16 RX ORDER — FUROSEMIDE 20 MG/1
20 TABLET ORAL DAILY
COMMUNITY

## 2024-01-16 RX ORDER — ATORVASTATIN CALCIUM 10 MG/1
10 TABLET, FILM COATED ORAL DAILY
Qty: 90 TABLET | Refills: 3 | Status: SHIPPED | OUTPATIENT
Start: 2024-01-16

## 2024-01-16 SDOH — ECONOMIC STABILITY: FOOD INSECURITY: WITHIN THE PAST 12 MONTHS, YOU WORRIED THAT YOUR FOOD WOULD RUN OUT BEFORE YOU GOT MONEY TO BUY MORE.: NEVER TRUE

## 2024-01-16 SDOH — ECONOMIC STABILITY: INCOME INSECURITY: HOW HARD IS IT FOR YOU TO PAY FOR THE VERY BASICS LIKE FOOD, HOUSING, MEDICAL CARE, AND HEATING?: NOT HARD AT ALL

## 2024-01-16 SDOH — ECONOMIC STABILITY: FOOD INSECURITY: WITHIN THE PAST 12 MONTHS, THE FOOD YOU BOUGHT JUST DIDN'T LAST AND YOU DIDN'T HAVE MONEY TO GET MORE.: NEVER TRUE

## 2024-01-16 ASSESSMENT — PATIENT HEALTH QUESTIONNAIRE - PHQ9
SUM OF ALL RESPONSES TO PHQ QUESTIONS 1-9: 0
SUM OF ALL RESPONSES TO PHQ QUESTIONS 1-9: 0
1. LITTLE INTEREST OR PLEASURE IN DOING THINGS: 0
2. FEELING DOWN, DEPRESSED OR HOPELESS: 0
SUM OF ALL RESPONSES TO PHQ9 QUESTIONS 1 & 2: 0
SUM OF ALL RESPONSES TO PHQ QUESTIONS 1-9: 0
SUM OF ALL RESPONSES TO PHQ QUESTIONS 1-9: 0

## 2024-01-16 NOTE — PROGRESS NOTES
1. Have you been to the ER, urgent care clinic since your last visit?  Hospitalized since your last visit?No    2. Have you seen or consulted any other health care providers outside of the [facility] since your last visit?  Include any pap smears or colon screening. Yes,  Cardiologist.    Chief Complaint   Patient presents with    Follow-up Chronic Condition     /74 (Site: Right Upper Arm, Position: Sitting, Cuff Size: Large Adult)   Pulse 88   Temp 97.5 °F (36.4 °C) (Temporal)   Resp 18   Ht 1.803 m (5' 11\")   Wt 93.4 kg (206 lb)   SpO2 97%   BMI 28.73 kg/m²

## 2024-01-16 NOTE — PROGRESS NOTES
Subjective  Chief Complaint   Patient presents with    Follow-up Chronic Condition     HPI:  Russ Alanis is a 84 y.o. male with medical problems as listed below who presents for follow up of chronic conditions.     Specialists: ENT. Cardiology. Podiatry.    Edema of lower extremities: Taking Lasix 20mg daily via Cardiology. He says this is helping.     Hypokalemia: Taking potassium supplement.     HTN: Requesting refills of lisinopril. Taking amlodipine 10 not 5.     HLD: Requesting refills of atorvastatin.    Atrial fibrillation: Taking Eliquis, amiodarone. Managed by Dr. Nieto.     Hyperthyroidism: Taking methimazole which is prescribed by Dr. Diaz.     Patient Active Problem List   Diagnosis    Incomplete rotator cuff tear or rupture of right shoulder, not specified as traumatic    Sensorineural hearing loss (SNHL) of both ears    Primary osteoarthritis involving multiple joints    Essential (primary) hypertension    Age-related macular degeneration    Atrial fibrillation (HCC)    Benign neoplasm of colon    Edema    History of prostate cancer    Hyperlipidemia    Hypokalemia    Open angle with borderline findings, low risk, bilateral    Hyperthyroidism     Family History   Problem Relation Age of Onset    Cancer Mother         NOT SURE. GYN ISSUES.     Heart Disease Mother     Stroke Father       Social History     Tobacco Use    Smoking status: Never    Smokeless tobacco: Never   Substance Use Topics    Alcohol use: Yes     Alcohol/week: 0.8 standard drinks of alcohol    Drug use: No     Current Outpatient Medications on File Prior to Visit   Medication Sig Dispense Refill    furosemide (LASIX) 20 MG tablet Take 1 tablet by mouth daily      KLOR-CON M20 20 MEQ extended release tablet TAKE 1 TABLET BY MOUTH TWO (2) TIMES A DAY FOR 15 DAYS. 180 tablet 1    amLODIPine (NORVASC) 5 MG tablet TAKE 1 TABLET BY MOUTH EVERY DAY 90 tablet 3    apixaban (ELIQUIS) 5 MG TABS tablet Take 1 tablet by mouth 2 times

## 2024-02-17 LAB
ALBUMIN SERPL-MCNC: 4.9 G/DL (ref 3.7–4.7)
ALBUMIN/GLOB SERPL: 2 {RATIO} (ref 1.2–2.2)
ALP SERPL-CCNC: 117 IU/L (ref 44–121)
ALT SERPL-CCNC: 13 IU/L (ref 0–44)
AST SERPL-CCNC: 13 IU/L (ref 0–40)
BILIRUB SERPL-MCNC: 0.7 MG/DL (ref 0–1.2)
BUN SERPL-MCNC: 18 MG/DL (ref 8–27)
BUN/CREAT SERPL: 18 (ref 10–24)
CALCIUM SERPL-MCNC: 9.5 MG/DL (ref 8.6–10.2)
CHLORIDE SERPL-SCNC: 105 MMOL/L (ref 96–106)
CO2 SERPL-SCNC: 22 MMOL/L (ref 20–29)
CREAT SERPL-MCNC: 1 MG/DL (ref 0.76–1.27)
EGFRCR SERPLBLD CKD-EPI 2021: 73 ML/MIN/1.73
GLOBULIN SER CALC-MCNC: 2.4 G/DL (ref 1.5–4.5)
GLUCOSE SERPL-MCNC: 64 MG/DL (ref 70–99)
POTASSIUM SERPL-SCNC: 3.8 MMOL/L (ref 3.5–5.2)
PROT SERPL-MCNC: 7.3 G/DL (ref 6–8.5)
SODIUM SERPL-SCNC: 144 MMOL/L (ref 134–144)
T4 FREE SERPL-MCNC: 1.63 NG/DL (ref 0.82–1.77)
TSH SERPL DL<=0.005 MIU/L-ACNC: 4.53 UIU/ML (ref 0.45–4.5)

## 2024-02-23 ENCOUNTER — APPOINTMENT (OUTPATIENT)
Facility: HOSPITAL | Age: 84
End: 2024-02-23
Attending: EMERGENCY MEDICINE
Payer: MEDICARE

## 2024-02-23 ENCOUNTER — HOSPITAL ENCOUNTER (EMERGENCY)
Facility: HOSPITAL | Age: 84
Discharge: HOME | End: 2024-02-23
Attending: EMERGENCY MEDICINE
Payer: MEDICARE

## 2024-02-23 VITALS
OXYGEN SATURATION: 98 % | TEMPERATURE: 97.9 F | HEART RATE: 83 BPM | SYSTOLIC BLOOD PRESSURE: 134 MMHG | DIASTOLIC BLOOD PRESSURE: 87 MMHG | HEIGHT: 71 IN | BODY MASS INDEX: 29.12 KG/M2 | RESPIRATION RATE: 18 BRPM | WEIGHT: 208 LBS

## 2024-02-23 DIAGNOSIS — L03.116 CELLULITIS OF LEFT LOWER EXTREMITY: ICD-10-CM

## 2024-02-23 DIAGNOSIS — I50.9 CONGESTIVE HEART FAILURE, UNSPECIFIED HF CHRONICITY, UNSPECIFIED HEART FAILURE TYPE (HCC): Primary | ICD-10-CM

## 2024-02-23 LAB
ALBUMIN SERPL-MCNC: 3.9 G/DL (ref 3.5–5)
ALBUMIN/GLOB SERPL: 1.1 (ref 1.1–2.2)
ALP SERPL-CCNC: 109 U/L (ref 45–117)
ALT SERPL-CCNC: 18 U/L (ref 12–78)
ANION GAP SERPL CALC-SCNC: 4 MMOL/L (ref 5–15)
AST SERPL W P-5'-P-CCNC: 19 U/L (ref 15–37)
BASOPHILS # BLD: 0.1 K/UL (ref 0–0.1)
BASOPHILS NFR BLD: 1 % (ref 0–1)
BILIRUB SERPL-MCNC: 1 MG/DL (ref 0.2–1)
BNP SERPL-MCNC: 1353 PG/ML
BUN SERPL-MCNC: 24 MG/DL (ref 6–20)
BUN/CREAT SERPL: 21 (ref 12–20)
CA-I BLD-MCNC: 9.1 MG/DL (ref 8.5–10.1)
CHLORIDE SERPL-SCNC: 109 MMOL/L (ref 97–108)
CO2 SERPL-SCNC: 26 MMOL/L (ref 21–32)
CREAT SERPL-MCNC: 1.13 MG/DL (ref 0.7–1.3)
DIFFERENTIAL METHOD BLD: ABNORMAL
ECHO BSA: 2.17 M2
EOSINOPHIL # BLD: 0.3 K/UL (ref 0–0.4)
EOSINOPHIL NFR BLD: 3 % (ref 0–7)
ERYTHROCYTE [DISTWIDTH] IN BLOOD BY AUTOMATED COUNT: 13.8 % (ref 11.5–14.5)
GLOBULIN SER CALC-MCNC: 3.4 G/DL (ref 2–4)
GLUCOSE SERPL-MCNC: 89 MG/DL (ref 65–100)
HCT VFR BLD AUTO: 43.2 % (ref 36.6–50.3)
HGB BLD-MCNC: 14.1 G/DL (ref 12.1–17)
IMM GRANULOCYTES # BLD AUTO: 0.1 K/UL (ref 0–0.04)
IMM GRANULOCYTES NFR BLD AUTO: 2 % (ref 0–0.5)
LYMPHOCYTES # BLD: 1.2 K/UL (ref 0.8–3.5)
LYMPHOCYTES NFR BLD: 15 % (ref 12–49)
MAGNESIUM SERPL-MCNC: 2.4 MG/DL (ref 1.6–2.4)
MCH RBC QN AUTO: 29 PG (ref 26–34)
MCHC RBC AUTO-ENTMCNC: 32.6 G/DL (ref 30–36.5)
MCV RBC AUTO: 88.7 FL (ref 80–99)
MONOCYTES # BLD: 1 K/UL (ref 0–1)
MONOCYTES NFR BLD: 12 % (ref 5–13)
NEUTS SEG # BLD: 5.7 K/UL (ref 1.8–8)
NEUTS SEG NFR BLD: 67 % (ref 32–75)
NRBC # BLD: 0 K/UL (ref 0–0.01)
NRBC BLD-RTO: 0 PER 100 WBC
PLATELET # BLD AUTO: 294 K/UL (ref 150–400)
PMV BLD AUTO: 9.2 FL (ref 8.9–12.9)
POTASSIUM SERPL-SCNC: 4.2 MMOL/L (ref 3.5–5.1)
PROCALCITONIN SERPL-MCNC: <0.05 NG/ML
PROT SERPL-MCNC: 7.3 G/DL (ref 6.4–8.2)
RBC # BLD AUTO: 4.87 M/UL (ref 4.1–5.7)
SODIUM SERPL-SCNC: 139 MMOL/L (ref 136–145)
TROPONIN I SERPL HS-MCNC: 13 NG/L (ref 0–76)
TSH SERPL DL<=0.05 MIU/L-ACNC: 2.25 UIU/ML (ref 0.36–3.74)
WBC # BLD AUTO: 8.4 K/UL (ref 4.1–11.1)

## 2024-02-23 PROCEDURE — 83735 ASSAY OF MAGNESIUM: CPT

## 2024-02-23 PROCEDURE — 80053 COMPREHEN METABOLIC PANEL: CPT

## 2024-02-23 PROCEDURE — 96374 THER/PROPH/DIAG INJ IV PUSH: CPT

## 2024-02-23 PROCEDURE — 84484 ASSAY OF TROPONIN QUANT: CPT

## 2024-02-23 PROCEDURE — 6370000000 HC RX 637 (ALT 250 FOR IP): Performed by: EMERGENCY MEDICINE

## 2024-02-23 PROCEDURE — 84443 ASSAY THYROID STIM HORMONE: CPT

## 2024-02-23 PROCEDURE — 36415 COLL VENOUS BLD VENIPUNCTURE: CPT

## 2024-02-23 PROCEDURE — 85025 COMPLETE CBC W/AUTO DIFF WBC: CPT

## 2024-02-23 PROCEDURE — 6360000002 HC RX W HCPCS: Performed by: EMERGENCY MEDICINE

## 2024-02-23 PROCEDURE — 99284 EMERGENCY DEPT VISIT MOD MDM: CPT

## 2024-02-23 PROCEDURE — 93971 EXTREMITY STUDY: CPT

## 2024-02-23 PROCEDURE — 83880 ASSAY OF NATRIURETIC PEPTIDE: CPT

## 2024-02-23 PROCEDURE — 2580000003 HC RX 258: Performed by: EMERGENCY MEDICINE

## 2024-02-23 PROCEDURE — 84145 PROCALCITONIN (PCT): CPT

## 2024-02-23 RX ORDER — FUROSEMIDE 10 MG/ML
40 INJECTION INTRAMUSCULAR; INTRAVENOUS
Status: DISCONTINUED | OUTPATIENT
Start: 2024-02-23 | End: 2024-02-23 | Stop reason: HOSPADM

## 2024-02-23 RX ORDER — CEPHALEXIN 500 MG/1
500 CAPSULE ORAL 4 TIMES DAILY
Qty: 40 CAPSULE | Refills: 0 | Status: SHIPPED | OUTPATIENT
Start: 2024-02-23

## 2024-02-23 RX ORDER — FUROSEMIDE 40 MG/1
40 TABLET ORAL DAILY
Status: DISCONTINUED | OUTPATIENT
Start: 2024-02-23 | End: 2024-02-23 | Stop reason: HOSPADM

## 2024-02-23 RX ORDER — FUROSEMIDE 40 MG/1
40 TABLET ORAL DAILY
Qty: 3 TABLET | Refills: 0 | Status: SHIPPED | OUTPATIENT
Start: 2024-02-23 | End: 2024-02-26

## 2024-02-23 RX ORDER — SULFAMETHOXAZOLE AND TRIMETHOPRIM 800; 160 MG/1; MG/1
1 TABLET ORAL 2 TIMES DAILY
Qty: 20 TABLET | Refills: 0 | Status: SHIPPED | OUTPATIENT
Start: 2024-02-23 | End: 2024-03-04

## 2024-02-23 RX ADMIN — CEFTRIAXONE SODIUM 1000 MG: 1 INJECTION, POWDER, FOR SOLUTION INTRAMUSCULAR; INTRAVENOUS at 13:35

## 2024-02-23 RX ADMIN — FUROSEMIDE 40 MG: 40 TABLET ORAL at 14:14

## 2024-02-23 ASSESSMENT — PAIN DESCRIPTION - FREQUENCY: FREQUENCY: INTERMITTENT

## 2024-02-23 ASSESSMENT — PAIN - FUNCTIONAL ASSESSMENT
PAIN_FUNCTIONAL_ASSESSMENT: ACTIVITIES ARE NOT PREVENTED
PAIN_FUNCTIONAL_ASSESSMENT: 0-10

## 2024-02-23 ASSESSMENT — PAIN DESCRIPTION - PAIN TYPE: TYPE: CHRONIC PAIN

## 2024-02-23 ASSESSMENT — PAIN DESCRIPTION - DESCRIPTORS: DESCRIPTORS: ACHING;THROBBING

## 2024-02-23 ASSESSMENT — PAIN SCALES - GENERAL: PAINLEVEL_OUTOF10: 8

## 2024-02-23 ASSESSMENT — PAIN DESCRIPTION - ORIENTATION: ORIENTATION: LEFT

## 2024-02-23 ASSESSMENT — LIFESTYLE VARIABLES
HOW MANY STANDARD DRINKS CONTAINING ALCOHOL DO YOU HAVE ON A TYPICAL DAY: PATIENT DOES NOT DRINK
HOW OFTEN DO YOU HAVE A DRINK CONTAINING ALCOHOL: NEVER

## 2024-02-23 ASSESSMENT — PAIN DESCRIPTION - LOCATION: LOCATION: FOOT

## 2024-02-23 NOTE — DISCHARGE INSTRUCTIONS
ER.      PLEASE TAKE 40 mg of Lasix for 3 days.  If you are not able to double up on your 20 mg Lasix at home I did send a small prescription for this 40 mg Lasix.  Please finish the full course of the antibiotics and follow-up closely with your doctor to ensure resolution of the swelling and redness.

## 2024-02-23 NOTE — ED TRIAGE NOTES
Patient here with left foot, numbness, redness on/off for months, worse pain and redness over last few days.

## 2024-02-23 NOTE — ED PROVIDER NOTES
To Discharge 02/23/2024 01:11:55 PM    Discharge Note: The patient is stable for discharge home. The signs, symptoms, diagnosis, and discharge instructions have been discussed, understanding conveyed, and agreed upon. The patient is to follow up as recommended or return to ER should their symptoms worsen.      PATIENT REFERRED TO:  Nuzhat Sterling, DO  [address]  [address]  [place] [address]  [phone]      or your cardiologist        DISCHARGE MEDICATIONS:     Medication List        START taking these medications      cephALEXin 500 MG capsule  Commonly known as: Keflex  Take 1 capsule by mouth 4 times daily     sulfamethoxazole-trimethoprim 800-160 MG per tablet  Commonly known as: Bactrim DS  Take 1 tablet by mouth 2 times daily for 10 days            CHANGE how you take these medications      * furosemide 20 MG tablet  Commonly known as: LASIX  What changed: Another medication with the same name was added. Make sure you understand how and when to take each.     * furosemide 40 MG tablet  Commonly known as: Lasix  Take 1 tablet by mouth daily for 3 days  What changed: You were already taking a medication with the same name, and this prescription was added. Make sure you understand how and when to take each.           * This list has 2 medication(s) that are the same as other medications prescribed for you. Read the directions carefully, and ask your doctor or other care provider to review them with you.                ASK your doctor about these medications      amiodarone 200 MG tablet  Commonly known as: CORDARONE     amLODIPine 5 MG tablet  Commonly known as: NORVASC  TAKE 1 TABLET BY MOUTH EVERY DAY     apixaban 5 MG Tabs tablet  Commonly known as: ELIQUIS     atorvastatin 10 MG tablet  Commonly known as: LIPITOR  Take 1 tablet by mouth daily     Klor-Con M20 20 MEQ extended release tablet  Generic drug: potassium chloride  TAKE 1 TABLET BY MOUTH TWO (2) TIMES A DAY FOR 15 DAYS.

## 2024-02-26 RX ORDER — METHIMAZOLE 5 MG/1
5 TABLET ORAL EVERY OTHER DAY
Qty: 45 TABLET | Refills: 1 | Status: SHIPPED | OUTPATIENT
Start: 2024-02-26

## 2024-03-12 ENCOUNTER — TELEPHONE (OUTPATIENT)
Facility: CLINIC | Age: 84
End: 2024-03-12

## 2024-03-12 NOTE — TELEPHONE ENCOUNTER
Patient spouse states patient is having right shoulder and elbow pain with a fluid sack just beneath skin on elbow    Knocked over by sheep in December and christie the shoulder     Questions if the congestive heart failure or gout has anything to do with fluid on elbow?    Questions recommendations

## 2024-03-16 ENCOUNTER — APPOINTMENT (OUTPATIENT)
Facility: HOSPITAL | Age: 84
End: 2024-03-16
Payer: MEDICARE

## 2024-03-16 ENCOUNTER — HOSPITAL ENCOUNTER (EMERGENCY)
Facility: HOSPITAL | Age: 84
Discharge: HOME | End: 2024-03-17
Payer: MEDICARE

## 2024-03-16 DIAGNOSIS — M25.521 RIGHT ELBOW PAIN: Primary | ICD-10-CM

## 2024-03-16 DIAGNOSIS — S42.455A NONDISPLACED FRACTURE OF LATERAL CONDYLE OF LEFT HUMERUS, INITIAL ENCOUNTER FOR CLOSED FRACTURE: ICD-10-CM

## 2024-03-16 PROCEDURE — 73080 X-RAY EXAM OF ELBOW: CPT

## 2024-03-16 PROCEDURE — 6370000000 HC RX 637 (ALT 250 FOR IP)

## 2024-03-16 PROCEDURE — 99284 EMERGENCY DEPT VISIT MOD MDM: CPT

## 2024-03-16 PROCEDURE — 73200 CT UPPER EXTREMITY W/O DYE: CPT

## 2024-03-16 RX ORDER — HYDROCODONE BITARTRATE AND ACETAMINOPHEN 5; 325 MG/1; MG/1
1 TABLET ORAL
Status: COMPLETED | OUTPATIENT
Start: 2024-03-16 | End: 2024-03-16

## 2024-03-16 RX ADMIN — HYDROCODONE BITARTRATE AND ACETAMINOPHEN 1 TABLET: 5; 325 TABLET ORAL at 21:19

## 2024-03-16 ASSESSMENT — PAIN SCALES - GENERAL
PAINLEVEL_OUTOF10: 7
PAINLEVEL_OUTOF10: 8
PAINLEVEL_OUTOF10: 7
PAINLEVEL_OUTOF10: 8

## 2024-03-16 ASSESSMENT — PAIN DESCRIPTION - LOCATION
LOCATION: ELBOW

## 2024-03-17 VITALS
TEMPERATURE: 98.1 F | OXYGEN SATURATION: 98 % | WEIGHT: 205 LBS | HEART RATE: 82 BPM | BODY MASS INDEX: 28.7 KG/M2 | DIASTOLIC BLOOD PRESSURE: 76 MMHG | HEIGHT: 71 IN | RESPIRATION RATE: 18 BRPM | SYSTOLIC BLOOD PRESSURE: 128 MMHG

## 2024-03-17 PROCEDURE — 29105 APPLICATION LONG ARM SPLINT: CPT

## 2024-03-17 RX ORDER — HYDROCODONE BITARTRATE AND ACETAMINOPHEN 5; 325 MG/1; MG/1
1 TABLET ORAL EVERY 6 HOURS PRN
Qty: 10 TABLET | Refills: 0 | Status: SHIPPED | OUTPATIENT
Start: 2024-03-17 | End: 2024-03-20

## 2024-03-17 ASSESSMENT — PAIN DESCRIPTION - LOCATION: LOCATION: ELBOW

## 2024-03-17 ASSESSMENT — PAIN SCALES - GENERAL: PAINLEVEL_OUTOF10: 5

## 2024-03-17 ASSESSMENT — PAIN - FUNCTIONAL ASSESSMENT: PAIN_FUNCTIONAL_ASSESSMENT: 0-10

## 2024-03-17 NOTE — ED PROVIDER NOTES
[facility] EMERGENCY DEPT  EMERGENCY DEPARTMENT HISTORY AND PHYSICAL EXAM      Date: 3/16/2024  Patient Name: Russ Alanis  MRN: [id number]  YOB: 1940  Date of evaluation: 3/16/2024  Provider: Dexter Hunter PA-C   Note Started: 8:53 PM EDT 3/16/24    HISTORY OF PRESENT ILLNESS     Chief Complaint   Patient presents with    Elbow Pain       History Provided By: Patient    HPI: Russ Alanis is a 84 y.o. male with past medical history as listed below currently on Eliquis presents emergency department for right elbow pain.  Patient reports that he stumbled and fell into a door jam hitting his elbow.  Patient reports that he bumped his head on the door jam but did not fall to the ground, denies any loss of consciousness.  Reports that he has been experiencing a soreness and pain to the right elbow for the last week when he hit the elbow it steadily increased his pain reporting sudden onset of swelling and discomfort after the event.  Denies taking any oral over-the-counter medications to treat his pain.    PAST MEDICAL HISTORY   Past Medical History:  Past Medical History:   Diagnosis Date    Arrhythmia 09/03/2014    RECENT DX OF ATRIAL FIBRILLATION    Arthritis     Cancer (HCC)     PROSTATE.    Decreased hearing 08/21/2020    Ear problem 08/21/2020    High cholesterol 08/21/2020    HTN (hypertension) 08/21/2020    Hypertension     CONTROLLED BY MEDS.    Other ill-defined conditions(799.89)     hypercholesterolemia    Other ill-defined conditions(799.89)        Past Surgical History:  Past Surgical History:   Procedure Laterality Date    APPENDECTOMY      CATARACT REMOVAL      both eyes    HEENT      repair of left eye detached retina    PROSTATECTOMY  05/08/2002    TOTAL HIP ARTHROPLASTY Right     TOTAL KNEE ARTHROPLASTY Left        Family History:  Family History   Problem Relation Age of Onset    Cancer Mother         NOT SURE. GYN ISSUES.     Heart Disease Mother     Stroke Father        Social

## 2024-03-20 ENCOUNTER — OFFICE VISIT (OUTPATIENT)
Age: 84
End: 2024-03-20
Payer: MEDICARE

## 2024-03-20 VITALS
HEIGHT: 71 IN | SYSTOLIC BLOOD PRESSURE: 123 MMHG | RESPIRATION RATE: 18 BRPM | HEART RATE: 76 BPM | BODY MASS INDEX: 28.7 KG/M2 | DIASTOLIC BLOOD PRESSURE: 79 MMHG | WEIGHT: 205 LBS | OXYGEN SATURATION: 97 %

## 2024-03-20 DIAGNOSIS — S42.454A CLOSED NONDISPLACED FRACTURE OF LATERAL CONDYLE OF RIGHT HUMERUS, INITIAL ENCOUNTER: Primary | ICD-10-CM

## 2024-03-20 PROCEDURE — G8484 FLU IMMUNIZE NO ADMIN: HCPCS | Performed by: ORTHOPAEDIC SURGERY

## 2024-03-20 PROCEDURE — 24576 CLTX HUMRL CNDYLR FX WO MNPJ: CPT | Performed by: ORTHOPAEDIC SURGERY

## 2024-03-20 PROCEDURE — 1123F ACP DISCUSS/DSCN MKR DOCD: CPT | Performed by: ORTHOPAEDIC SURGERY

## 2024-03-20 PROCEDURE — G8427 DOCREV CUR MEDS BY ELIG CLIN: HCPCS | Performed by: ORTHOPAEDIC SURGERY

## 2024-03-20 PROCEDURE — G8419 CALC BMI OUT NRM PARAM NOF/U: HCPCS | Performed by: ORTHOPAEDIC SURGERY

## 2024-03-20 PROCEDURE — 1036F TOBACCO NON-USER: CPT | Performed by: ORTHOPAEDIC SURGERY

## 2024-03-20 PROCEDURE — 99203 OFFICE O/P NEW LOW 30 MIN: CPT | Performed by: ORTHOPAEDIC SURGERY

## 2024-03-20 RX ORDER — HYDROCODONE BITARTRATE AND ACETAMINOPHEN 5; 325 MG/1; MG/1
1 TABLET ORAL EVERY 6 HOURS PRN
Qty: 20 TABLET | Refills: 0 | Status: SHIPPED | OUTPATIENT
Start: 2024-03-20 | End: 2024-03-25

## 2024-03-20 ASSESSMENT — PATIENT HEALTH QUESTIONNAIRE - PHQ9
SUM OF ALL RESPONSES TO PHQ QUESTIONS 1-9: 0
2. FEELING DOWN, DEPRESSED OR HOPELESS: NOT AT ALL
SUM OF ALL RESPONSES TO PHQ QUESTIONS 1-9: 0
SUM OF ALL RESPONSES TO PHQ9 QUESTIONS 1 & 2: 0
1. LITTLE INTEREST OR PLEASURE IN DOING THINGS: NOT AT ALL
SUM OF ALL RESPONSES TO PHQ QUESTIONS 1-9: 0
SUM OF ALL RESPONSES TO PHQ QUESTIONS 1-9: 0

## 2024-03-20 NOTE — PROGRESS NOTES
Identified pt with two pt identifiers (name and ). Reviewed chart in preparation for visit and have obtained necessary documentation.    Russ Alanis is a 84 y.o. male  Chief Complaint   Patient presents with    New Patient     Patient broke his R elbow. Patient states that he is not having any pain.      /79 (Site: Left Upper Arm, Position: Sitting, Cuff Size: Medium Adult)   Pulse 76   Resp 18   Ht 1.803 m (5' 11\")   Wt 93 kg (205 lb)   SpO2 97%   BMI 28.59 kg/m²     1. Have you been to the ER, urgent care clinic since your last visit?  Hospitalized since your last visit?no    2. Have you seen or consulted any other health care providers outside of the [facility] since your last visit?  Include any pap smears or colon screening. no

## 2024-04-01 ENCOUNTER — OFFICE VISIT (OUTPATIENT)
Age: 84
End: 2024-04-01
Payer: MEDICARE

## 2024-04-01 VITALS
HEART RATE: 71 BPM | TEMPERATURE: 98.1 F | OXYGEN SATURATION: 99 % | WEIGHT: 205.4 LBS | HEIGHT: 71 IN | DIASTOLIC BLOOD PRESSURE: 76 MMHG | SYSTOLIC BLOOD PRESSURE: 114 MMHG | BODY MASS INDEX: 28.76 KG/M2 | RESPIRATION RATE: 17 BRPM

## 2024-04-01 DIAGNOSIS — I48.11 LONGSTANDING PERSISTENT ATRIAL FIBRILLATION (HCC): ICD-10-CM

## 2024-04-01 DIAGNOSIS — E05.90 HYPERTHYROIDISM: Primary | ICD-10-CM

## 2024-04-01 DIAGNOSIS — I50.9 CONGESTIVE HEART FAILURE, UNSPECIFIED HF CHRONICITY, UNSPECIFIED HEART FAILURE TYPE (HCC): ICD-10-CM

## 2024-04-01 PROCEDURE — 1123F ACP DISCUSS/DSCN MKR DOCD: CPT | Performed by: INTERNAL MEDICINE

## 2024-04-01 PROCEDURE — G8427 DOCREV CUR MEDS BY ELIG CLIN: HCPCS | Performed by: INTERNAL MEDICINE

## 2024-04-01 PROCEDURE — G8419 CALC BMI OUT NRM PARAM NOF/U: HCPCS | Performed by: INTERNAL MEDICINE

## 2024-04-01 PROCEDURE — 1036F TOBACCO NON-USER: CPT | Performed by: INTERNAL MEDICINE

## 2024-04-01 PROCEDURE — 99214 OFFICE O/P EST MOD 30 MIN: CPT | Performed by: INTERNAL MEDICINE

## 2024-04-01 RX ORDER — METHIMAZOLE 5 MG/1
TABLET ORAL
Qty: 30 TABLET | Refills: 1 | Status: SHIPPED | OUTPATIENT
Start: 2024-04-01

## 2024-04-01 ASSESSMENT — PATIENT HEALTH QUESTIONNAIRE - PHQ9
1. LITTLE INTEREST OR PLEASURE IN DOING THINGS: NOT AT ALL
2. FEELING DOWN, DEPRESSED OR HOPELESS: NOT AT ALL
SUM OF ALL RESPONSES TO PHQ QUESTIONS 1-9: 0
SUM OF ALL RESPONSES TO PHQ9 QUESTIONS 1 & 2: 0
SUM OF ALL RESPONSES TO PHQ QUESTIONS 1-9: 0

## 2024-04-01 NOTE — PROGRESS NOTES
Patient identified with two identification factors, Name and Date of Birth.    Chief Complaint   Patient presents with    Follow-up    Thyroid Problem       /76 (Site: Left Upper Arm, Position: Sitting, Cuff Size: Medium Adult)   Pulse 71   Temp 98.1 °F (36.7 °C)   Resp 17   Ht 1.803 m (5' 11\")   Wt 93.2 kg (205 lb 6.4 oz)   SpO2 99%   BMI 28.65 kg/m²       1. \"Have you been to the ER, urgent care clinic since your last visit?  Hospitalized since your last visit?\" Yes 02/23/24 for swollen feet and rash 03/16/24 for right  broken elbow    2. \"Have you seen or consulted any other health care providers outside of the [facility] since your last visit?\" No

## 2024-04-01 NOTE — PATIENT INSTRUCTIONS
and cancelling appointments within 24 hours are kept track of and three  of such situations in  two consecutive years will likely be considered for termination from the practice    -------------------------------------------------------------------------------------------------------------------

## 2024-04-01 NOTE — PROGRESS NOTES
[facility] DI[facility] ENDOCRINOLOGY                Idalia Diaz MD FACE    HISTORY OF PRESENT ILLNESS   Russ Alanis is a 84 y.o. male.   HPI    He is referred to me by his pcp for evaluation of abnormal thyroid labs ,  Last visit  in  Sept 2023   He has persistent afib, CHF - on Amiodarone,  on blood thinner   and suffers from severe leg edema      Pt had a fall, broke his elbow   Accompanied by daughter     Sept 2023   He is c/o pedal edema   He has diuretics   and  his  K is fluctuant     March 2023   - initial visit    Patient is a pleasant man accompanied by his wife and son.   Wife has a small cute Journal  where she has kept track of   medical info  for more than 20 years  about her 's illnesses and the medications he started on.    Patient has rolling tremors  and  he wants to know If thyroid is causing this issue . He is not a good historian   I gathered info from [facility], pcp notes  and  help of his wife     Patient has longstanding history of atrial fibrillation.  He has been taking amiodarone for many number of years, prescribed by Dr. Gerson Devlin.  Patient is in sinus rhythm per his last notes from September 2022   Patient had multiple times thyroid function test done by his PCP and PCP noted that there is elevation of free T4 and normal TSH values happening since March 2022 onwards      Patient had ER visit for atypical chest pain but was found to have hypokalemia          Review of Systems    As above          Physical Exam    Constitutional:  oriented to person, place, and time.    Neck:  Neck supple. No thyromegaly present.    Leg edema           Labs      Lab Results   Component Value Date    TSH 4.530 (H) 02/16/2024    BYC9IBS 2.25 02/23/2024    T4FREE 1.63 02/16/2024             ASSESSMENT and PLAN       Abnormal TFTs :  higher free T4 , but normal TSH    Called pharmacy and inquired  -    2014 -  notes indicated   warfarin use ( start of a-fib likely )    Feb 2018

## 2024-04-16 ENCOUNTER — HOSPITAL ENCOUNTER (OUTPATIENT)
Facility: HOSPITAL | Age: 84
Discharge: HOME | End: 2024-04-19
Payer: MEDICARE

## 2024-04-16 DIAGNOSIS — S42.454A CLOSED NONDISPLACED FRACTURE OF LATERAL CONDYLE OF RIGHT HUMERUS, INITIAL ENCOUNTER: ICD-10-CM

## 2024-04-16 PROCEDURE — 73070 X-RAY EXAM OF ELBOW: CPT

## 2024-04-23 NOTE — PROGRESS NOTES
is here for a follow up visit for right nondisplaced distal humerus lateral condyle fracture that is being treated nonoperatively after fall into door frame on 3/16/24.   Patient reports pain is controlled.    He reports some swelling over olecranon and mild pain in triceps. The patient denies numbness/paresthesias.    Current Outpatient Medications on File Prior to Visit   Medication Sig Dispense Refill    methIMAzole (TAPAZOLE) 5 MG tablet Change to one pill on  MONDAY  and  FRIDAY 30 tablet 1    furosemide (LASIX) 40 MG tablet Take 1 tablet by mouth daily for 3 days 3 tablet 0    atorvastatin (LIPITOR) 10 MG tablet Take 1 tablet by mouth daily 90 tablet 3    lisinopril (PRINIVIL;ZESTRIL) 40 MG tablet Take 1 tablet by mouth daily 90 tablet 3    KLOR-CON M20 20 MEQ extended release tablet TAKE 1 TABLET BY MOUTH TWO (2) TIMES A DAY FOR 15 DAYS. 180 tablet 1    amLODIPine (NORVASC) 5 MG tablet TAKE 1 TABLET BY MOUTH EVERY DAY 90 tablet 3    apixaban (ELIQUIS) 5 MG TABS tablet Take 1 tablet by mouth 2 times daily      amiodarone (CORDARONE) 200 MG tablet TAKE 1/2 TABLET BY MOUTH DAILY       No current facility-administered medications on file prior to visit.       ROS:  General: denies agitation, fevers/chills  Cardiac: denies major chest pain  Gastrointestinal: denies nausea/vomiting  Neurologic: Denies numbness/paresthesias  Skin: Denies erythema, warmth to touch, active drainage  Musculoskeletal: Endorses appropriate pain at surgical site      Physical Examination:    There were no vitals taken for this visit.    GENERAL: Patient is a healthy-appearing and in no apparent distress. Afebrile, normotensive, regular rate  MENTAL STATUS: Alert and oriented to time, place, person; mood and affect normal.  PULMONARY: Respiratory rate normal and non-labored on room air.  GASTROINTESTINAL: Nondistended abdomen  CARDIAC: Regular rate and rhythm. Without pitting edema of affected extremity and peripheral pulses

## 2024-04-24 ENCOUNTER — OFFICE VISIT (OUTPATIENT)
Age: 84
End: 2024-04-24

## 2024-04-24 VITALS
OXYGEN SATURATION: 98 % | TEMPERATURE: 98.6 F | WEIGHT: 205 LBS | HEIGHT: 71 IN | RESPIRATION RATE: 18 BRPM | BODY MASS INDEX: 28.7 KG/M2 | DIASTOLIC BLOOD PRESSURE: 79 MMHG | SYSTOLIC BLOOD PRESSURE: 114 MMHG | HEART RATE: 77 BPM

## 2024-04-24 DIAGNOSIS — S42.454D CLOSED NONDISPLACED FRACTURE OF LATERAL CONDYLE OF RIGHT HUMERUS WITH ROUTINE HEALING, SUBSEQUENT ENCOUNTER: Primary | ICD-10-CM

## 2024-04-24 PROCEDURE — 99024 POSTOP FOLLOW-UP VISIT: CPT | Performed by: ORTHOPAEDIC SURGERY

## 2024-04-24 RX ORDER — AMLODIPINE BESYLATE 10 MG/1
10 TABLET ORAL DAILY
COMMUNITY
Start: 2024-04-06

## 2024-04-24 ASSESSMENT — PATIENT HEALTH QUESTIONNAIRE - PHQ9
SUM OF ALL RESPONSES TO PHQ QUESTIONS 1-9: 0
SUM OF ALL RESPONSES TO PHQ9 QUESTIONS 1 & 2: 0
SUM OF ALL RESPONSES TO PHQ QUESTIONS 1-9: 0
1. LITTLE INTEREST OR PLEASURE IN DOING THINGS: NOT AT ALL
2. FEELING DOWN, DEPRESSED OR HOPELESS: NOT AT ALL
SUM OF ALL RESPONSES TO PHQ QUESTIONS 1-9: 0
SUM OF ALL RESPONSES TO PHQ QUESTIONS 1-9: 0

## 2024-04-24 NOTE — PROGRESS NOTES
Identified pt with two pt identifiers (name and ). Reviewed chart in preparation for visit and have obtained necessary documentation.    Russ Alanis is a 84 y.o. male  Chief Complaint   Patient presents with    Follow-up     Reason for Visit: Right Elbow Fracture  Pain level: 2   Patient states that his shoulders hurts.       /79 (Site: Left Upper Arm, Position: Sitting, Cuff Size: Large Adult)   Pulse 77   Temp 98.6 °F (37 °C) (Oral)   Resp 18   Ht 1.803 m (5' 11\")   Wt 93 kg (205 lb)   SpO2 98%   BMI 28.59 kg/m²     1. Have you been to the ER, urgent care clinic since your last visit?  Hospitalized since your last visit?no    2. Have you seen or consulted any other health care providers outside of the [facility] since your last visit?  Include any pap smears or colon screening. no

## 2024-04-28 DIAGNOSIS — E05.90 HYPERTHYROIDISM: ICD-10-CM

## 2024-04-29 DIAGNOSIS — E05.90 HYPERTHYROIDISM: ICD-10-CM

## 2024-04-29 RX ORDER — METHIMAZOLE 5 MG/1
TABLET ORAL
Qty: 90 TABLET | Refills: 1 | OUTPATIENT
Start: 2024-04-29

## 2024-04-30 RX ORDER — METHIMAZOLE 5 MG/1
TABLET ORAL
Qty: 30 TABLET | Refills: 1 | Status: SHIPPED | OUTPATIENT
Start: 2024-04-30

## 2024-05-16 ENCOUNTER — OFFICE VISIT (OUTPATIENT)
Facility: CLINIC | Age: 84
End: 2024-05-16
Payer: MEDICARE

## 2024-05-16 VITALS
TEMPERATURE: 95.6 F | HEIGHT: 71 IN | OXYGEN SATURATION: 96 % | BODY MASS INDEX: 28.42 KG/M2 | RESPIRATION RATE: 18 BRPM | DIASTOLIC BLOOD PRESSURE: 84 MMHG | SYSTOLIC BLOOD PRESSURE: 135 MMHG | WEIGHT: 203 LBS | HEART RATE: 84 BPM

## 2024-05-16 DIAGNOSIS — I10 ESSENTIAL (PRIMARY) HYPERTENSION: ICD-10-CM

## 2024-05-16 DIAGNOSIS — Z00.00 MEDICARE ANNUAL WELLNESS VISIT, SUBSEQUENT: Primary | ICD-10-CM

## 2024-05-16 DIAGNOSIS — I48.19 PERSISTENT ATRIAL FIBRILLATION (HCC): ICD-10-CM

## 2024-05-16 DIAGNOSIS — E78.5 HYPERLIPIDEMIA, UNSPECIFIED HYPERLIPIDEMIA TYPE: ICD-10-CM

## 2024-05-16 DIAGNOSIS — E87.6 HYPOKALEMIA: ICD-10-CM

## 2024-05-16 DIAGNOSIS — E05.90 HYPERTHYROIDISM: ICD-10-CM

## 2024-05-16 DIAGNOSIS — R60.0 LOCALIZED EDEMA: ICD-10-CM

## 2024-05-16 PROCEDURE — G8419 CALC BMI OUT NRM PARAM NOF/U: HCPCS | Performed by: FAMILY MEDICINE

## 2024-05-16 PROCEDURE — 99214 OFFICE O/P EST MOD 30 MIN: CPT | Performed by: FAMILY MEDICINE

## 2024-05-16 PROCEDURE — G0439 PPPS, SUBSEQ VISIT: HCPCS | Performed by: FAMILY MEDICINE

## 2024-05-16 PROCEDURE — 1036F TOBACCO NON-USER: CPT | Performed by: FAMILY MEDICINE

## 2024-05-16 PROCEDURE — 1123F ACP DISCUSS/DSCN MKR DOCD: CPT | Performed by: FAMILY MEDICINE

## 2024-05-16 PROCEDURE — G8427 DOCREV CUR MEDS BY ELIG CLIN: HCPCS | Performed by: FAMILY MEDICINE

## 2024-05-16 RX ORDER — FUROSEMIDE 40 MG/1
40 TABLET ORAL 2 TIMES DAILY
COMMUNITY

## 2024-05-16 ASSESSMENT — LIFESTYLE VARIABLES: HOW MANY STANDARD DRINKS CONTAINING ALCOHOL DO YOU HAVE ON A TYPICAL DAY: PATIENT DOES NOT DRINK

## 2024-05-16 ASSESSMENT — PATIENT HEALTH QUESTIONNAIRE - PHQ9
SUM OF ALL RESPONSES TO PHQ QUESTIONS 1-9: 0
2. FEELING DOWN, DEPRESSED OR HOPELESS: NOT AT ALL
1. LITTLE INTEREST OR PLEASURE IN DOING THINGS: NOT AT ALL
SUM OF ALL RESPONSES TO PHQ QUESTIONS 1-9: 0
SUM OF ALL RESPONSES TO PHQ9 QUESTIONS 1 & 2: 0

## 2024-05-16 NOTE — PATIENT INSTRUCTIONS
[email or web address] and enter R798 to learn more about \"Hearing Loss: Care Instructions.\"  Current as of: September 27, 2023               Content Version: 14.0  © 2006-2024 [employer].   Care instructions adapted under license by [employer]. If you have questions about a medical condition or this instruction, always ask your healthcare professional. [employer] disclaims any warranty or liability for your use of this information.           A Healthy Heart: Care Instructions  Overview     Coronary artery disease, also called heart disease, occurs when a substance called plaque builds up in the vessels that supply oxygen-rich blood to your heart muscle. This can narrow the blood vessels and reduce blood flow. A heart attack happens when blood flow is completely blocked. A high-fat diet, smoking, and other factors increase the risk of heart disease.  Your doctor has found that you have a chance of having heart disease. A heart-healthy lifestyle can help keep your heart healthy and prevent heart disease. This lifestyle includes eating healthy, being active, staying at a weight that's healthy for you, and not smoking or using tobacco. It also includes taking medicines as directed, managing other health conditions, and trying to get a healthy amount of sleep.  Follow-up care is a key part of your treatment and safety. Be sure to make and go to all appointments, and call your doctor if you are having problems. It's also a good idea to know your test results and keep a list of the medicines you take.  How can you care for yourself at home?  Diet    Use less salt when you cook and eat. This helps lower your blood pressure. Taste food before salting. Add only a little salt when you think you need it. With time, your taste buds will adjust to less salt.     Eat fewer snack items, fast foods, canned soups, and other high-salt, high-fat, processed foods.     Read food labels and try

## 2024-05-16 NOTE — PROGRESS NOTES
Medicare Annual Wellness Visit    Russ Alanis is here for Medicare AWV    Assessment & Plan   Medicare annual wellness visit, subsequent  Chart reviewed and updated as needed. Care gaps discussed.     Persistent atrial fibrillation (HCC)  Followed by Dr. Nieto. Most recent clinic note reviewed. Continue amiodarone 200mg daily and Eliquis 5mg BID as prescribed.     Essential (primary) hypertension  Hypokalemia  Localized edema  Well controlled. Continue lisinopril 40mg daily, amlodipine 10mg daily. Recall, HCTZ was discontinued due to persistent hypokalemia for which patient continues to take potassium 20mEq daily. Last K 4.2 in 02/2024. Amlodipine is possibly contributing to lower extremity edema. Consider discontinuing. For now continue Lasix 40mg BID as recommended by Dr. Nieto. Lasix is likely contributing to hypokalemia. Advised to trial compression stockings and elevate feet as often as he can.     Hyperlipidemia, unspecified hyperlipidemia type  Continue atorvastatin 10mg daily. Will need to obtain lipid panel from Dr. Nieto.     Hyperthyroidism  Thought to be secondary to long-term amiodarone. Managed by Dr. Diaz. Continue methimazole as prescribed.     Recommendations for Preventive Services Due: see orders and patient instructions/AVS.  Recommended screening schedule for the next 5-10 years is provided to the patient in written form: see Patient Instructions/AVS.     Return in about 6 months (around 11/16/2024) for Chronic problems.     Subjective   The following acute and/or chronic problems were also addressed today:    HLD  Atrial fibrillation  HTN  Hyperthyroidism  Lower extremity edema    Patient's complete Health Risk Assessment and screening values have been reviewed and are found in Flowsheets. The following problems were reviewed today and where indicated follow up appointments were made and/or referrals ordered.    Positive Risk Factor Screenings with Interventions:    Fall Risk:  Do you

## 2024-05-16 NOTE — PROGRESS NOTES
\"Have you been to the ER, urgent care clinic since your last visit?  Hospitalized since your last visit?\"    YES - When: approximately 1 days ago.  Where and Why: patient stated foot swelling with a rash .    “Have you seen or consulted any other health care providers outside of [facility] since your last visit?”    NO    Chief Complaint   Patient presents with    Medicare AWV     /84 (Site: Left Upper Arm, Position: Sitting, Cuff Size: Large Adult)   Pulse 84   Temp (!) 95.6 °F (35.3 °C) (Temporal)   Resp 18   Ht 1.803 m (5' 11\")   Wt 92.1 kg (203 lb)   SpO2 96%   BMI 28.31 kg/m²               Click Here for Release of Records Request

## 2024-05-22 ENCOUNTER — OFFICE VISIT (OUTPATIENT)
Age: 84
End: 2024-05-22
Payer: MEDICARE

## 2024-05-22 VITALS
HEART RATE: 91 BPM | BODY MASS INDEX: 28.5 KG/M2 | OXYGEN SATURATION: 99 % | HEIGHT: 71 IN | RESPIRATION RATE: 20 BRPM | WEIGHT: 203.6 LBS | DIASTOLIC BLOOD PRESSURE: 94 MMHG | TEMPERATURE: 97.1 F | SYSTOLIC BLOOD PRESSURE: 128 MMHG

## 2024-05-22 DIAGNOSIS — B35.1 ONYCHOMYCOSIS: Primary | ICD-10-CM

## 2024-05-22 DIAGNOSIS — L85.9 HYPERKERATOSIS: ICD-10-CM

## 2024-05-22 PROCEDURE — 1123F ACP DISCUSS/DSCN MKR DOCD: CPT | Performed by: PODIATRIST

## 2024-05-22 PROCEDURE — 99214 OFFICE O/P EST MOD 30 MIN: CPT | Performed by: PODIATRIST

## 2024-05-22 PROCEDURE — 1036F TOBACCO NON-USER: CPT | Performed by: PODIATRIST

## 2024-05-22 PROCEDURE — G8419 CALC BMI OUT NRM PARAM NOF/U: HCPCS | Performed by: PODIATRIST

## 2024-05-22 PROCEDURE — G8427 DOCREV CUR MEDS BY ELIG CLIN: HCPCS | Performed by: PODIATRIST

## 2024-05-22 ASSESSMENT — PATIENT HEALTH QUESTIONNAIRE - PHQ9
SUM OF ALL RESPONSES TO PHQ QUESTIONS 1-9: 0
SUM OF ALL RESPONSES TO PHQ9 QUESTIONS 1 & 2: 0
SUM OF ALL RESPONSES TO PHQ QUESTIONS 1-9: 0
SUM OF ALL RESPONSES TO PHQ QUESTIONS 1-9: 0
1. LITTLE INTEREST OR PLEASURE IN DOING THINGS: NOT AT ALL
2. FEELING DOWN, DEPRESSED OR HOPELESS: NOT AT ALL
SUM OF ALL RESPONSES TO PHQ QUESTIONS 1-9: 0

## 2024-05-22 NOTE — PROGRESS NOTES
Identified pt with two pt identifiers(name and ). Reviewed record in preparation for visit and have obtained necessary documentation. All patient medications has been reviewed.  Chief Complaint   Patient presents with    Follow-up     3 months follow up     Foot Pain     Both         Vitals:    24 1130   BP: (!) 128/94   Pulse:    Resp:    Temp:    SpO2:                    Coordination of Care Questionnaire:   1) Have you been to an emergency room, urgent care, or hospitalized since your last visit?   no       2. Have seen or consulted any other health care provider since your last visit? no        Patient is accompanied by self I have received verbal consent from Russ Alanis to discuss any/all medical information while they are present in the room.

## 2024-06-19 NOTE — PROGRESS NOTES
[facility]  [facility]      Subjective:         Patient is a 84 y.o. male who is being seen as a returning pt for thick/discolored toenails and painful calluses. Denies any pain or trauma. Denies any recent diagnostic testing to confirm a diagnosis. Denies any systemic signs of infx. Denies any other LE complaints.      Past Medical History:   Diagnosis Date    Arrhythmia 09/03/2014    RECENT DX OF ATRIAL FIBRILLATION    Arthritis     Cancer (HCC)     PROSTATE.    CHF (congestive heart failure) (HCC) 02/23/2024    Decreased hearing 08/21/2020    Ear problem 08/21/2020    High cholesterol 08/21/2020    HTN (hypertension) 08/21/2020    Hypertension     CONTROLLED BY MEDS.    Other ill-defined conditions(799.89)     hypercholesterolemia    Other ill-defined conditions(799.89)      Past Surgical History:   Procedure Laterality Date    APPENDECTOMY      CATARACT REMOVAL      both eyes    HEENT      repair of left eye detached retina    PROSTATECTOMY  05/08/2002    TOTAL HIP ARTHROPLASTY Right     TOTAL KNEE ARTHROPLASTY Bilateral        Family History   Problem Relation Age of Onset    Cancer Mother         NOT SURE. GYN ISSUES.     Heart Disease Mother     Stroke Father       Social History     Tobacco Use    Smoking status: Never    Smokeless tobacco: Never   Substance Use Topics    Alcohol use: Yes     Alcohol/week: 0.8 standard drinks of alcohol     No Known Allergies  Prior to Admission medications    Medication Sig Start Date End Date Taking? Authorizing Provider   furosemide (LASIX) 40 MG tablet Take 1 tablet by mouth in the morning and 1 tablet in the evening.   Yes Bony Gray MD   methIMAzole (TAPAZOLE) 5 MG tablet Change to one pill on  MONDAY  and  FRIDAY 4/30/24  Yes Idalia Diaz MD   amLODIPine (NORVASC) 10 MG tablet Take 1 tablet by mouth daily 4/6/24  Yes Bony Gray MD   atorvastatin (LIPITOR) 10 MG tablet Take 1 tablet by mouth daily 1/16/24

## 2024-07-03 ENCOUNTER — HOSPITAL ENCOUNTER (EMERGENCY)
Facility: HOSPITAL | Age: 84
Discharge: HOME | End: 2024-07-03
Attending: EMERGENCY MEDICINE
Payer: MEDICARE

## 2024-07-03 ENCOUNTER — APPOINTMENT (OUTPATIENT)
Facility: HOSPITAL | Age: 84
End: 2024-07-03
Payer: MEDICARE

## 2024-07-03 VITALS
TEMPERATURE: 98 F | RESPIRATION RATE: 18 BRPM | SYSTOLIC BLOOD PRESSURE: 123 MMHG | HEIGHT: 71 IN | BODY MASS INDEX: 28.7 KG/M2 | HEART RATE: 81 BPM | OXYGEN SATURATION: 95 % | WEIGHT: 205 LBS | DIASTOLIC BLOOD PRESSURE: 92 MMHG

## 2024-07-03 DIAGNOSIS — L03.115 CELLULITIS OF RIGHT LOWER EXTREMITY: ICD-10-CM

## 2024-07-03 DIAGNOSIS — M79.89 LEG SWELLING: Primary | ICD-10-CM

## 2024-07-03 LAB
ALBUMIN SERPL-MCNC: 3.6 G/DL (ref 3.5–5)
ALBUMIN/GLOB SERPL: 1 (ref 1.1–2.2)
ALP SERPL-CCNC: 120 U/L (ref 45–117)
ALT SERPL-CCNC: 18 U/L (ref 12–78)
ANION GAP SERPL CALC-SCNC: 6 MMOL/L (ref 5–15)
AST SERPL W P-5'-P-CCNC: 14 U/L (ref 15–37)
BASOPHILS # BLD: 0.1 K/UL (ref 0–0.1)
BASOPHILS NFR BLD: 1 % (ref 0–1)
BILIRUB SERPL-MCNC: 0.6 MG/DL (ref 0.2–1)
BNP SERPL-MCNC: 1262 PG/ML
BUN SERPL-MCNC: 22 MG/DL (ref 6–20)
BUN/CREAT SERPL: 19 (ref 12–20)
CA-I BLD-MCNC: 9.1 MG/DL (ref 8.5–10.1)
CHLORIDE SERPL-SCNC: 109 MMOL/L (ref 97–108)
CO2 SERPL-SCNC: 24 MMOL/L (ref 21–32)
CREAT SERPL-MCNC: 1.18 MG/DL (ref 0.7–1.3)
DIFFERENTIAL METHOD BLD: ABNORMAL
EOSINOPHIL # BLD: 0.4 K/UL (ref 0–0.4)
EOSINOPHIL NFR BLD: 5 % (ref 0–7)
ERYTHROCYTE [DISTWIDTH] IN BLOOD BY AUTOMATED COUNT: 13.8 % (ref 11.5–14.5)
GLOBULIN SER CALC-MCNC: 3.7 G/DL (ref 2–4)
GLUCOSE SERPL-MCNC: 104 MG/DL (ref 65–100)
HCT VFR BLD AUTO: 41.2 % (ref 36.6–50.3)
HGB BLD-MCNC: 13.4 G/DL (ref 12.1–17)
IMM GRANULOCYTES # BLD AUTO: 0.2 K/UL (ref 0–0.04)
IMM GRANULOCYTES NFR BLD AUTO: 2 % (ref 0–0.5)
LYMPHOCYTES # BLD: 1 K/UL (ref 0.8–3.5)
LYMPHOCYTES NFR BLD: 13 % (ref 12–49)
MCH RBC QN AUTO: 29 PG (ref 26–34)
MCHC RBC AUTO-ENTMCNC: 32.5 G/DL (ref 30–36.5)
MCV RBC AUTO: 89.2 FL (ref 80–99)
MONOCYTES # BLD: 1 K/UL (ref 0–1)
MONOCYTES NFR BLD: 12 % (ref 5–13)
NEUTS SEG # BLD: 5.4 K/UL (ref 1.8–8)
NEUTS SEG NFR BLD: 67 % (ref 32–75)
NRBC # BLD: 0 K/UL (ref 0–0.01)
NRBC BLD-RTO: 0 PER 100 WBC
PLATELET # BLD AUTO: 268 K/UL (ref 150–400)
PMV BLD AUTO: 9 FL (ref 8.9–12.9)
POTASSIUM SERPL-SCNC: 3.4 MMOL/L (ref 3.5–5.1)
PROT SERPL-MCNC: 7.3 G/DL (ref 6.4–8.2)
RBC # BLD AUTO: 4.62 M/UL (ref 4.1–5.7)
SODIUM SERPL-SCNC: 139 MMOL/L (ref 136–145)
WBC # BLD AUTO: 7.9 K/UL (ref 4.1–11.1)

## 2024-07-03 PROCEDURE — 96375 TX/PRO/DX INJ NEW DRUG ADDON: CPT

## 2024-07-03 PROCEDURE — 85025 COMPLETE CBC W/AUTO DIFF WBC: CPT

## 2024-07-03 PROCEDURE — 36415 COLL VENOUS BLD VENIPUNCTURE: CPT

## 2024-07-03 PROCEDURE — 71045 X-RAY EXAM CHEST 1 VIEW: CPT

## 2024-07-03 PROCEDURE — 99285 EMERGENCY DEPT VISIT HI MDM: CPT

## 2024-07-03 PROCEDURE — 80053 COMPREHEN METABOLIC PANEL: CPT

## 2024-07-03 PROCEDURE — 6360000002 HC RX W HCPCS: Performed by: EMERGENCY MEDICINE

## 2024-07-03 PROCEDURE — 96365 THER/PROPH/DIAG IV INF INIT: CPT

## 2024-07-03 PROCEDURE — 93005 ELECTROCARDIOGRAM TRACING: CPT | Performed by: EMERGENCY MEDICINE

## 2024-07-03 PROCEDURE — 83880 ASSAY OF NATRIURETIC PEPTIDE: CPT

## 2024-07-03 RX ORDER — CEPHALEXIN 500 MG/1
500 CAPSULE ORAL 4 TIMES DAILY
Qty: 40 CAPSULE | Refills: 0 | Status: SHIPPED | OUTPATIENT
Start: 2024-07-03 | End: 2024-07-13

## 2024-07-03 RX ORDER — SULFAMETHOXAZOLE AND TRIMETHOPRIM 800; 160 MG/1; MG/1
1 TABLET ORAL 2 TIMES DAILY
Qty: 20 TABLET | Refills: 0 | Status: SHIPPED | OUTPATIENT
Start: 2024-07-03 | End: 2024-07-13

## 2024-07-03 RX ORDER — FUROSEMIDE 10 MG/ML
40 INJECTION INTRAMUSCULAR; INTRAVENOUS
Status: COMPLETED | OUTPATIENT
Start: 2024-07-03 | End: 2024-07-03

## 2024-07-03 RX ORDER — CLINDAMYCIN PHOSPHATE 900 MG/50ML
900 INJECTION, SOLUTION INTRAVENOUS
Status: COMPLETED | OUTPATIENT
Start: 2024-07-03 | End: 2024-07-03

## 2024-07-03 RX ADMIN — FUROSEMIDE 40 MG: 10 INJECTION, SOLUTION INTRAMUSCULAR; INTRAVENOUS at 21:18

## 2024-07-03 RX ADMIN — CLINDAMYCIN PHOSPHATE 900 MG: 900 INJECTION, SOLUTION INTRAVENOUS at 21:54

## 2024-07-03 ASSESSMENT — PAIN SCALES - GENERAL
PAINLEVEL_OUTOF10: 3
PAINLEVEL_OUTOF10: 2

## 2024-07-03 ASSESSMENT — PAIN DESCRIPTION - LOCATION: LOCATION: ANKLE

## 2024-07-03 ASSESSMENT — LIFESTYLE VARIABLES
HOW OFTEN DO YOU HAVE A DRINK CONTAINING ALCOHOL: NEVER
HOW MANY STANDARD DRINKS CONTAINING ALCOHOL DO YOU HAVE ON A TYPICAL DAY: PATIENT DOES NOT DRINK

## 2024-07-03 ASSESSMENT — PAIN - FUNCTIONAL ASSESSMENT: PAIN_FUNCTIONAL_ASSESSMENT: 0-10

## 2024-07-03 NOTE — ED TRIAGE NOTES
Patient states that his feet and ankles have been slowly swelling up for the past 3 days. He takes Furosemide and has not missed any doses. His wife and son expressed in Triage that his has been an ongoing issue that has not been able to be resolved.

## 2024-07-04 LAB
EKG ATRIAL RATE: 105 BPM
EKG DIAGNOSIS: NORMAL
EKG Q-T INTERVAL: 388 MS
EKG QRS DURATION: 88 MS
EKG QTC CALCULATION (BAZETT): 466 MS
EKG R AXIS: 12 DEGREES
EKG T AXIS: 136 DEGREES
EKG VENTRICULAR RATE: 87 BPM

## 2024-07-04 NOTE — ED PROVIDER NOTES
109 (H) 97 - 108 mmol/L    CO2 24 21 - 32 mmol/L    Anion Gap 6 5 - 15 mmol/L    Glucose 104 (H) 65 - 100 mg/dL    BUN 22 (H) 6 - 20 mg/dL    Creatinine 1.18 0.70 - 1.30 mg/dL    BUN/Creatinine Ratio 19 12 - 20      Est, Glom Filt Rate 60 (L) >60 ml/min/1.73m2    Calcium 9.1 8.5 - 10.1 mg/dL    Total Bilirubin 0.6 0.2 - 1.0 mg/dL    AST 14 (L) 15 - 37 U/L    ALT 18 12 - 78 U/L    Alk Phosphatase 120 (H) 45 - 117 U/L    Total Protein 7.3 6.4 - 8.2 g/dL    Albumin 3.6 3.5 - 5.0 g/dL    Globulin 3.7 2.0 - 4.0 g/dL    Albumin/Globulin Ratio 1.0 (L) 1.1 - 2.2     Brain Natriuretic Peptide    Collection Time: 07/03/24  8:45 PM   Result Value Ref Range    NT Pro-BNP 1,262 (H) <450 pg/mL       EKG: Initial EKG interpreted by me shows:  Atrial fibrillation at rate of 87 bpm.  No STEMI..    Radiologic Studies:  Non-plain film images such as CT, ultrasound and MRI are read by the radiologist.   Plain radiographic images are visualized and preliminarily interpreted by me, the ED Provider, with the following findings:   Not applicable.    Interpretation per the Radiologist below, if available at the time of this note:  XR CHEST 1 VIEW   Final Result   Unchanged cardiomegaly         Electronically signed by CHET MOTA         EMERGENCY DEPARTMENT COURSE and DIFFERENTIAL DIAGNOSIS/MDM   CC/HPI Summary: Leg edema, rash  Ddx: Cellulitis, DVT, CHF, dependent edema  ED Course and Reassessment:   Concern for likely cellulitis of the right leg.  Patient given antibiotic and IV Lasix 40 mg  Discharged with Bactrim and Keflex after relatively negative studies  I recommended he take Lasix twice daily for the next 5 days  Given return precautions    I considered right lower extremity ultrasound to rule out DVT but did not perform. I utilized my training and experience to weigh the risks and benefits of these considerations. At this time, I estimate the risks of performing these considerations to be equal to or greater than the risk of 
DISPLAY PLAN FREE TEXT

## 2024-07-04 NOTE — ED NOTES
Discharge instructions reviewed. All questions answered. Pt ambulated with walker to bathroom. Family waiting to take pt out to car. Wheelchair ready to roll him outside. Stable on departure.

## 2024-07-08 ENCOUNTER — TELEPHONE (OUTPATIENT)
Facility: CLINIC | Age: 84
End: 2024-07-08

## 2024-07-08 NOTE — TELEPHONE ENCOUNTER
Patient was seen in [facility] ED on 7/3/24 for swelling in extremities and Afib.  He states he sees Dr Nieto for cardiology with [facility] but would like a referral to Cardiology within [facility].  I have scheduled him for an ED follow up on 7/16/2024

## 2024-07-16 ENCOUNTER — OFFICE VISIT (OUTPATIENT)
Facility: CLINIC | Age: 84
End: 2024-07-16
Payer: MEDICARE

## 2024-07-16 VITALS
BODY MASS INDEX: 28.56 KG/M2 | OXYGEN SATURATION: 100 % | RESPIRATION RATE: 18 BRPM | TEMPERATURE: 96.9 F | HEART RATE: 100 BPM | DIASTOLIC BLOOD PRESSURE: 69 MMHG | SYSTOLIC BLOOD PRESSURE: 122 MMHG | HEIGHT: 71 IN | WEIGHT: 204 LBS

## 2024-07-16 DIAGNOSIS — E87.6 HYPOKALEMIA: ICD-10-CM

## 2024-07-16 DIAGNOSIS — E78.5 HYPERLIPIDEMIA, UNSPECIFIED HYPERLIPIDEMIA TYPE: ICD-10-CM

## 2024-07-16 DIAGNOSIS — I10 ESSENTIAL (PRIMARY) HYPERTENSION: Primary | ICD-10-CM

## 2024-07-16 DIAGNOSIS — R60.0 BILATERAL LOWER EXTREMITY EDEMA: ICD-10-CM

## 2024-07-16 PROCEDURE — G8427 DOCREV CUR MEDS BY ELIG CLIN: HCPCS | Performed by: FAMILY MEDICINE

## 2024-07-16 PROCEDURE — 1036F TOBACCO NON-USER: CPT | Performed by: FAMILY MEDICINE

## 2024-07-16 PROCEDURE — G8419 CALC BMI OUT NRM PARAM NOF/U: HCPCS | Performed by: FAMILY MEDICINE

## 2024-07-16 PROCEDURE — 1123F ACP DISCUSS/DSCN MKR DOCD: CPT | Performed by: FAMILY MEDICINE

## 2024-07-16 PROCEDURE — 99214 OFFICE O/P EST MOD 30 MIN: CPT | Performed by: FAMILY MEDICINE

## 2024-07-16 RX ORDER — CARVEDILOL 6.25 MG/1
6.25 TABLET ORAL 2 TIMES DAILY
Qty: 180 TABLET | Refills: 1 | Status: SHIPPED | OUTPATIENT
Start: 2024-07-16

## 2024-07-16 RX ORDER — POTASSIUM CHLORIDE 1.5 G/1.58G
20 POWDER, FOR SOLUTION ORAL 2 TIMES DAILY
Qty: 180 EACH | Refills: 1 | Status: SHIPPED | OUTPATIENT
Start: 2024-07-16

## 2024-07-16 RX ORDER — AMLODIPINE BESYLATE 10 MG/1
10 TABLET ORAL DAILY
Qty: 30 TABLET | Status: CANCELLED | OUTPATIENT
Start: 2024-07-16

## 2024-07-16 RX ORDER — POTASSIUM CHLORIDE 1.5 G/1.58G
20 POWDER, FOR SOLUTION ORAL 2 TIMES DAILY
COMMUNITY
End: 2024-07-16 | Stop reason: SDUPTHER

## 2024-07-16 NOTE — PROGRESS NOTES
\"Have you been to the ER, urgent care clinic since your last visit?  Hospitalized since your last visit?\"    YES - When: approximately 1 days ago.  Where and Why: July 3rd .    “Have you seen or consulted any other health care providers outside of [facility] since your last visit?”    NO    Chief Complaint   Patient presents with    Follow-Up from Hospital     /69 (Site: Left Upper Arm, Position: Sitting, Cuff Size: Large Adult)   Pulse 100   Temp 96.9 °F (36.1 °C) (Temporal)   Resp 18   Ht 1.803 m (5' 11\")   Wt 92.5 kg (204 lb)   SpO2 100%   BMI 28.45 kg/m²               Click Here for Release of Records Request

## 2024-07-16 NOTE — PROGRESS NOTES
Subjective  Chief Complaint   Patient presents with    Follow-Up from Hospital     HPI:  Russ Alanis is a 84 y.o. male with medical problems as listed below who presents for ED follow up.     ED follow up/leg swelling: Treated for cellulitis and advised to double Lasix for 5 days. He reports improved swelling and redness. He actually doubled his Lasix for 10 days. He is currently taking Lasix 40mg BID.     Patient Active Problem List   Diagnosis    Incomplete rotator cuff tear or rupture of right shoulder, not specified as traumatic    Sensorineural hearing loss (SNHL) of both ears    Primary osteoarthritis involving multiple joints    Essential (primary) hypertension    Age-related macular degeneration    Atrial fibrillation (HCC)    Benign neoplasm of colon    Edema    History of prostate cancer    Hyperlipidemia    Hypokalemia    Open angle with borderline findings, low risk, bilateral    Hyperthyroidism     Family History   Problem Relation Age of Onset    Cancer Mother         NOT SURE. GYN ISSUES.     Heart Disease Mother     Stroke Father       Social History     Tobacco Use    Smoking status: Never    Smokeless tobacco: Never   Vaping Use    Vaping Use: Never used   Substance Use Topics    Alcohol use: Yes     Alcohol/week: 0.8 standard drinks of alcohol    Drug use: No     Current Outpatient Medications on File Prior to Visit   Medication Sig Dispense Refill    furosemide (LASIX) 40 MG tablet Take 1 tablet by mouth daily      methIMAzole (TAPAZOLE) 5 MG tablet Change to one pill on  MONDAY  and  FRIDAY 30 tablet 1    atorvastatin (LIPITOR) 10 MG tablet Take 1 tablet by mouth daily 90 tablet 3    lisinopril (PRINIVIL;ZESTRIL) 40 MG tablet Take 1 tablet by mouth daily 90 tablet 3    apixaban (ELIQUIS) 5 MG TABS tablet Take 1 tablet by mouth 2 times daily      amiodarone (CORDARONE) 200 MG tablet TAKE 1/2 TABLET BY MOUTH DAILY       No current facility-administered medications on file prior to visit.

## 2024-07-18 ENCOUNTER — TELEPHONE (OUTPATIENT)
Facility: CLINIC | Age: 84
End: 2024-07-18

## 2024-07-18 RX ORDER — POTASSIUM CHLORIDE 1500 MG/1
TABLET, EXTENDED RELEASE ORAL
Qty: 180 TABLET | Refills: 1 | OUTPATIENT
Start: 2024-07-18

## 2024-07-18 NOTE — TELEPHONE ENCOUNTER
Patients wife came into office and wanted to know if fmla paperwork has been done.  Please review with Dr. Sterling and once paperwork is done, please fax to number on paperwork and call and let them know it has been done.

## 2024-07-18 NOTE — TELEPHONE ENCOUNTER
Called patient and left message that [facility] paperwork has been completed and faxed. Also, they could come by and  paperwork upfront.

## 2024-07-19 LAB
BUN SERPL-MCNC: 26 MG/DL (ref 8–27)
BUN/CREAT SERPL: 21 (ref 10–24)
CALCIUM SERPL-MCNC: 9.7 MG/DL (ref 8.6–10.2)
CHLORIDE SERPL-SCNC: 105 MMOL/L (ref 96–106)
CHOLEST SERPL-MCNC: 167 MG/DL (ref 100–199)
CO2 SERPL-SCNC: 22 MMOL/L (ref 20–29)
CREAT SERPL-MCNC: 1.23 MG/DL (ref 0.76–1.27)
EGFRCR SERPLBLD CKD-EPI 2021: 57 ML/MIN/1.73
GLUCOSE SERPL-MCNC: 65 MG/DL (ref 70–99)
HDLC SERPL-MCNC: 65 MG/DL
LDLC SERPL CALC-MCNC: 87 MG/DL (ref 0–99)
POTASSIUM SERPL-SCNC: 4.2 MMOL/L (ref 3.5–5.2)
SODIUM SERPL-SCNC: 144 MMOL/L (ref 134–144)
TRIGL SERPL-MCNC: 77 MG/DL (ref 0–149)
TSH SERPL DL<=0.005 MIU/L-ACNC: 3.72 UIU/ML (ref 0.45–4.5)
VLDLC SERPL CALC-MCNC: 15 MG/DL (ref 5–40)

## 2024-07-30 ENCOUNTER — TELEPHONE (OUTPATIENT)
Facility: CLINIC | Age: 84
End: 2024-07-30

## 2024-07-30 ENCOUNTER — LAB (OUTPATIENT)
Facility: CLINIC | Age: 84
End: 2024-07-30
Payer: MEDICARE

## 2024-07-30 VITALS — HEART RATE: 53 BPM | DIASTOLIC BLOOD PRESSURE: 91 MMHG | OXYGEN SATURATION: 97 % | SYSTOLIC BLOOD PRESSURE: 143 MMHG

## 2024-07-30 PROCEDURE — 99211 OFF/OP EST MAY X REQ PHY/QHP: CPT | Performed by: FAMILY MEDICINE

## 2024-07-30 NOTE — PROGRESS NOTES
Patient came in for BP reading. Todays reading is 143/91 p 53  Patient stated that his BP was over 200/105 yesterday at home.   Staying in the 150-160 range

## 2024-08-07 NOTE — TELEPHONE ENCOUNTER
I think we need to stop the carvedilol and go back to the amlodipine. His heart rate is much lower than it was prior to starting the carvedilol. Has he noticed a difference in his leg swelling since stopping the amlodipine?   I would recommend he stop the carvedilol and resume amlodipine 10mg daily based on the blood pressure and heart rate readings he has relayed to us. Please let him know.

## 2024-08-07 NOTE — TELEPHONE ENCOUNTER
I spoke with the patient and advised of providers recommendations. He is very hesitant about restarting amlodipine as his swelling if finally gone.  He also mentioned that if you would like for him to go back on amlodipine he will need a new script but prefers not to.

## 2024-08-08 NOTE — TELEPHONE ENCOUNTER
Well that's good that his swelling has improved off of the amlodipine. We don't have to resume that one. I don't love that his blood pressure has been so high. I'd like to see him back sooner than November to follow up on this. I have multiple openings next week if he would be able to come in then.

## 2024-08-12 NOTE — TELEPHONE ENCOUNTER
Patient stated he went to pharmacy to  his potassium medication and the charge was $190.00 so patient did not .  Patient stated last time he only had to pay $7.00.  Patient would like a call back from the clinical team to go over medication.  Please call [phone].

## 2024-08-13 RX ORDER — POTASSIUM CHLORIDE 20 MEQ/1
20 TABLET, EXTENDED RELEASE ORAL 2 TIMES DAILY
COMMUNITY
End: 2024-08-13 | Stop reason: SDUPTHER

## 2024-08-13 NOTE — TELEPHONE ENCOUNTER
Please resend rx for potassium. It was sent over for packets and that was the reason that it was costing so much for the patient. Patient called and stated that he takes the tablets.

## 2024-08-13 NOTE — TELEPHONE ENCOUNTER
Called Pharmacy and they stated that the cost has gone up because he was receiving Tablets before now and the rx that come through was for powder. Called the patient and he was not at home. Wife stated that she will have him call us back. Need to verify that he is able to swallow the pills still and if so resend rx over for tablets.

## 2024-08-14 NOTE — TELEPHONE ENCOUNTER
Called patient to get him scheduled did not  when the patient call back can we get him scheduled.

## 2024-08-15 RX ORDER — POTASSIUM CHLORIDE 20 MEQ/1
20 TABLET, EXTENDED RELEASE ORAL 2 TIMES DAILY
Qty: 180 TABLET | Refills: 1 | Status: SHIPPED | OUTPATIENT
Start: 2024-08-15

## 2024-09-21 LAB
T4 FREE SERPL-MCNC: 1.55 NG/DL (ref 0.82–1.77)
TSH SERPL DL<=0.005 MIU/L-ACNC: 2.94 UIU/ML (ref 0.45–4.5)

## 2024-10-01 ENCOUNTER — OFFICE VISIT (OUTPATIENT)
Age: 84
End: 2024-10-01
Payer: MEDICARE

## 2024-10-01 ENCOUNTER — TELEPHONE (OUTPATIENT)
Age: 84
End: 2024-10-01

## 2024-10-01 VITALS
BODY MASS INDEX: 28.15 KG/M2 | DIASTOLIC BLOOD PRESSURE: 85 MMHG | HEART RATE: 70 BPM | TEMPERATURE: 98.1 F | WEIGHT: 201.1 LBS | HEIGHT: 71 IN | OXYGEN SATURATION: 96 % | SYSTOLIC BLOOD PRESSURE: 135 MMHG

## 2024-10-01 DIAGNOSIS — E05.90 HYPERTHYROIDISM: Primary | ICD-10-CM

## 2024-10-01 PROCEDURE — G8427 DOCREV CUR MEDS BY ELIG CLIN: HCPCS | Performed by: INTERNAL MEDICINE

## 2024-10-01 PROCEDURE — 1123F ACP DISCUSS/DSCN MKR DOCD: CPT | Performed by: INTERNAL MEDICINE

## 2024-10-01 PROCEDURE — G8484 FLU IMMUNIZE NO ADMIN: HCPCS | Performed by: INTERNAL MEDICINE

## 2024-10-01 PROCEDURE — 1036F TOBACCO NON-USER: CPT | Performed by: INTERNAL MEDICINE

## 2024-10-01 PROCEDURE — G8419 CALC BMI OUT NRM PARAM NOF/U: HCPCS | Performed by: INTERNAL MEDICINE

## 2024-10-01 PROCEDURE — 99214 OFFICE O/P EST MOD 30 MIN: CPT | Performed by: INTERNAL MEDICINE

## 2024-10-01 RX ORDER — AMLODIPINE BESYLATE 10 MG/1
10 TABLET ORAL DAILY
COMMUNITY
Start: 2024-09-12 | End: 2025-09-12

## 2024-10-01 NOTE — PROGRESS NOTES
Russ Alanis is a 84 y.o. male here for   Chief Complaint   Patient presents with    Thyroid Problem       1. Have you been to the ER, urgent care clinic since your last visit?  Hospitalized since your last visit? -Yes, 07/03/2024 Leg swelling    2. Have you seen or consulted any other health care providers outside of the [facility] since your last visit?  Include any pap smears or colon screening.-No      /85 (Site: Left Upper Arm, Position: Sitting, Cuff Size: Large Adult)   Pulse 70   Temp 98.1 °F (36.7 °C) (Temporal)   Ht 1.803 m (5' 11\")   Wt 91.2 kg (201 lb 1.6 oz)   SpO2 96%   BMI 28.05 kg/m²         
likely )    Feb 2018  - amiodarone  has been consistent likely     Reviewed old lab rahul records -  patient had normal labs until march 2021 . March 2022 onwards, seems like the change started    Sept 2022 -  the thyrodi gland is normal , so there is no background nodular goiter    Pt is not symptomatic of hyperthyroid state ( ? Apathetic given the age possible )    Could be Amiodarone related and it is a needed medication for atrial fib , and cannot be changed or stopped    So, only one option is to try ATD at low dose . Start  on tapazole  5 mg every other day - March 2023    Discussed it with family    Sept 2023  : euthyroid , stay on tapazole 5 mg every other day   March 2024  :  staying on AMIODARONE ,   sub- clinical hypothyroid state ,   will change  TAPAZOLE to   5 mg   Two days  a week   October 2024  :  EUTHYROID   on current dose  -  tapazole  5 mg  TWO DAYS A WEEK  . Checked pt  bag  and I did nto see  AMIODARONE   Read Dr. Gerson Guajardo's notes  and  I did nto find that bottle   As he is euthyroid , will  keep tapazole as is       2. Persistent atrial fib /CHF  - followed by Harsh Arnold    and   Pedal edema  is his chief complaint    ? Consider changing norvasc if permissible because of leg edema ( message sent to Dr. Nieto)   On [facility]      Follow up in 6 months      Reviewed results with patient and discussed the labs being ordered today/bnv   Patient voiced understanding of plan of care

## 2024-10-01 NOTE — PATIENT INSTRUCTIONS
SPECIFIC INSTRUCTIONS BELOW     TAKE   tapazole  5 mg   Mon   and  Fri     -----------------------------------------------------------------------    Go home  -  AMIODARONE  or   CARDARONE            -------------PAY ATTENTION TO THESE GENERAL INSTRUCTIONS -----------------      - The medications prescribed at this visit will not be available at pharmacy until 6 pm       - YOUR MED LIST IS NOT UP TO DATE AS SOME CHANGES ARE BEING MADE AFTER THE VISIT - FOLLOW SPECIFIC INSTRUCTIONS  ABOVE     -ANY tests other than blood work, which you opt to do  outside the  [facility] imaging facilities, you are responsible for prior authorizations if  required    - HEALTH MAINTENANCE IS NOT GOING TO BE UP TO DATE ON YOUR AVS- PLEASE IGNORE     Results     *Normal results will not be notified by a phone call starting January 1 2021   *If you have an upcoming visit, the results will be discussed at the visit   *Please sign up for MY CHART if you want access to your lab and test results  *Abnormal results which require immediate attention will be notified by phone call   *Abnormal results which do not require immediate assistance will be notified in 1-2 weeks       Refills    -    have your pharmacy send us a refill request . Refills are done max for one year and a visit is a must before refills are extended    Follow up appointments -  highly encourage you to make it when you are checking out. We can accommodate you into the schedule based on your clinical situation, but not for extending refills beyond a year. Labs are important to give refills and is important to get labs before the visit     Phone calls  -  Allow  24 hrs. for non-urgent calls to be returned  Prior authorization - It may take 2-4 weeks to process  Forms  -  FMLA, DMV etc., will take up to 2 weeks to process  Cancellations - please notify the office 2 days in advance   Samples  - will only be dispensed at visits       If not showing for the appointments and

## 2024-10-01 NOTE — TELEPHONE ENCOUNTER
Patients wife brought back a medication you were looking for it is: Amiodarone 200mg 1/2 tab daily.

## 2024-10-01 NOTE — TELEPHONE ENCOUNTER
It was on his list , but he did not have it in medicine bag     He definitely did not remember much when asked about it repeatedly ( elderly man )     I treat him for over active thyroid state which goes along with this heart  medication     I needed  to know for sure that there has been no discontinuation of  this medication . If so, I  have to plan for stopping my prescribed tapazole     Bottom line,  he has to stay on the methimazole at same dose  - inform  him or wife please     Idalia Diaz MD

## 2024-10-02 NOTE — TELEPHONE ENCOUNTER
Informed pt of Dr. Diaz's note. Pt verbalized understanding with no further questions or concerns at this time.

## 2024-11-04 ENCOUNTER — OFFICE VISIT (OUTPATIENT)
Age: 84
End: 2024-11-04
Payer: MEDICARE

## 2024-11-04 ENCOUNTER — PROCEDURE VISIT (OUTPATIENT)
Age: 84
End: 2024-11-04

## 2024-11-04 VITALS
HEART RATE: 112 BPM | OXYGEN SATURATION: 100 % | DIASTOLIC BLOOD PRESSURE: 76 MMHG | BODY MASS INDEX: 28.05 KG/M2 | HEIGHT: 71 IN | SYSTOLIC BLOOD PRESSURE: 118 MMHG

## 2024-11-04 DIAGNOSIS — H90.3 SENSORINEURAL HEARING LOSS, BILATERAL: Primary | ICD-10-CM

## 2024-11-04 DIAGNOSIS — H61.22 LEFT EAR IMPACTED CERUMEN: ICD-10-CM

## 2024-11-04 DIAGNOSIS — H90.3 SENSORINEURAL HEARING LOSS (SNHL), BILATERAL: Primary | ICD-10-CM

## 2024-11-04 PROCEDURE — 1159F MED LIST DOCD IN RCRD: CPT | Performed by: OTOLARYNGOLOGY

## 2024-11-04 PROCEDURE — G8484 FLU IMMUNIZE NO ADMIN: HCPCS | Performed by: OTOLARYNGOLOGY

## 2024-11-04 PROCEDURE — 1036F TOBACCO NON-USER: CPT | Performed by: OTOLARYNGOLOGY

## 2024-11-04 PROCEDURE — 99212 OFFICE O/P EST SF 10 MIN: CPT | Performed by: OTOLARYNGOLOGY

## 2024-11-04 PROCEDURE — G8427 DOCREV CUR MEDS BY ELIG CLIN: HCPCS | Performed by: OTOLARYNGOLOGY

## 2024-11-04 PROCEDURE — G8419 CALC BMI OUT NRM PARAM NOF/U: HCPCS | Performed by: OTOLARYNGOLOGY

## 2024-11-04 PROCEDURE — 1123F ACP DISCUSS/DSCN MKR DOCD: CPT | Performed by: OTOLARYNGOLOGY

## 2024-11-04 NOTE — PROGRESS NOTES
Russ Alanis  1940.84 y.o. male   [id number]    HEARING AID CHECK    /MODEL: [employer] i1600     RIGHT EAR: SN: [id number]  COMBINED /MOLD: [id number]    LEFT EAR: SN: [id number]  /EARMOLD: 60(4) [id number]    WARRANTY: 2018 ()  L&D ELIGIBLE: No    Russ Alanis was seen today, 2024, for a hearing aid check appointment.      PROCEDURES:     Patient noted his left device did not function well.  A listening check revealed good function of the right device and poor function of the left device. Devices were cleaned and suctioned, wax traps were changed. Post-cleaning listening check revealed good function of the right device and poor function of the left device. The left  was changed, which did not help sound quality.    The patient was informed of the out-of-warranty repair cost compared to the cost of getting new hearing aids. The patient will take some time to consider.    PATIENT EDUCATION:     - Verbally and visually reviewed importance of consistent use and care and maintenance of devices.      Information was verbally shared with patient during appointment.        RECOMMENDATIONS:     Continue consistent hearing aid use  Return for a hearing aid check as needed  Retest hearing as medically indicated and/or sooner if a change in hearing is noted.  Contact audiologist with questions/concerns as needed  Return for HAE if desired      **No charge for today's appointment; patient utilized 1 out of 3 follow-up visits.       Annabelle Marques, CCC-A  Audiologist

## 2024-11-04 NOTE — PROGRESS NOTES
Otolaryngology-Head and Neck Surgery  Follow Up Patient Visit     Patient: Russ Alanis  YOB: 1940  MRN: [id number]  Date of Service:  11/4/2024    Chief Complaint:   Chief Complaint   Patient presents with    Follow-up     Ear cleaning         History of Present Illness: Russ Alanis is a 84 y.o. male who has seen this practice for bilateral SNHL, last seen by Matthew MULLEN and our audiologist last year     Has issues with left ear hearing aid   After Dr Griffin adjusted hearing aids last year, seemed to help only short term    Right hearing aid seems fine    Past Medical History:  Past Medical History:   Diagnosis Date    Arrhythmia 09/03/2014    RECENT DX OF ATRIAL FIBRILLATION    Arthritis     Cancer (HCC)     PROSTATE.    CHF (congestive heart failure) (HCC) 02/23/2024    Decreased hearing 08/21/2020    Ear problem 08/21/2020    High cholesterol 08/21/2020    HTN (hypertension) 08/21/2020    Hypertension     CONTROLLED BY MEDS.    Other ill-defined conditions(799.89)     hypercholesterolemia    Other ill-defined conditions(799.89)        Past Surgical History:   Past Surgical History:   Procedure Laterality Date    APPENDECTOMY      CATARACT REMOVAL      both eyes    HEENT      repair of left eye detached retina    PROSTATECTOMY  05/08/2002    TOTAL HIP ARTHROPLASTY Right     TOTAL KNEE ARTHROPLASTY Bilateral        Medications:   Current Outpatient Medications   Medication Instructions    amiodarone (CORDARONE) 200 MG tablet TAKE 1/2 TABLET BY MOUTH DAILY    amLODIPine (NORVASC) 10 mg, Oral, DAILY    apixaban (ELIQUIS) 5 mg, Oral, 2 TIMES DAILY    atorvastatin (LIPITOR) 10 mg, Oral, DAILY    furosemide (LASIX) 40 mg, Oral, DAILY    lisinopril (PRINIVIL;ZESTRIL) 40 mg, Oral, DAILY    methIMAzole (TAPAZOLE) 5 MG tablet Change to one pill on  MONDAY  and  FRIDAY    potassium chloride (KLOR-CON M) 20 MEQ extended release tablet 20 mEq, Oral, 2 TIMES DAILY       Allergies:   No Known

## 2024-11-15 ENCOUNTER — OFFICE VISIT (OUTPATIENT)
Facility: CLINIC | Age: 84
End: 2024-11-15

## 2024-11-15 VITALS
HEART RATE: 89 BPM | SYSTOLIC BLOOD PRESSURE: 130 MMHG | BODY MASS INDEX: 28.56 KG/M2 | WEIGHT: 204 LBS | HEIGHT: 71 IN | DIASTOLIC BLOOD PRESSURE: 77 MMHG | TEMPERATURE: 98.5 F | RESPIRATION RATE: 14 BRPM | OXYGEN SATURATION: 98 %

## 2024-11-15 DIAGNOSIS — I10 ESSENTIAL (PRIMARY) HYPERTENSION: Primary | ICD-10-CM

## 2024-11-15 DIAGNOSIS — E87.6 HYPOKALEMIA: ICD-10-CM

## 2024-11-15 DIAGNOSIS — Z23 IMMUNIZATION DUE: ICD-10-CM

## 2024-11-15 NOTE — PROGRESS NOTES
\"Have you been to the ER, urgent care clinic since your last visit?  Hospitalized since your last visit?\"    NO    “Have you seen or consulted any other health care providers outside our system since your last visit?”    NO  Chief Complaint   Patient presents with    Follow-up         /77 (Site: Left Upper Arm, Position: Sitting, Cuff Size: Large Adult)   Pulse 89   Temp 98.5 °F (36.9 °C) (Temporal)   Resp 14   Ht 1.803 m (5' 11\")   Wt 92.5 kg (204 lb)   SpO2 98%   BMI 28.45 kg/m²

## 2024-11-15 NOTE — PROGRESS NOTES
Subjective  Chief Complaint   Patient presents with    Follow-up     HPI:  Russ Alanis is a 84 y.o. male with medical problems as listed below who presents for blood pressure follow up.     Leg swelling has been good. He was restarted on amlodipine at Dr. Nieto's office. Carvedilol was causing lower heart rates so that was discontinued. 's/70's.     Patient Active Problem List   Diagnosis    Incomplete rotator cuff tear or rupture of right shoulder, not specified as traumatic    Sensorineural hearing loss (SNHL) of both ears    Primary osteoarthritis involving multiple joints    Essential (primary) hypertension    Age-related macular degeneration    Atrial fibrillation (HCC)    Benign neoplasm of colon    Edema    History of prostate cancer    Hyperlipidemia    Hypokalemia    Open angle with borderline findings, low risk, bilateral    Hyperthyroidism     Family History   Problem Relation Age of Onset    Cancer Mother         NOT SURE. GYN ISSUES.     Heart Disease Mother     Stroke Father       Social History     Tobacco Use    Smoking status: Never    Smokeless tobacco: Never   Vaping Use    Vaping status: Never Used   Substance Use Topics    Alcohol use: Yes     Alcohol/week: 0.8 standard drinks of alcohol    Drug use: No     Current Outpatient Medications on File Prior to Visit   Medication Sig Dispense Refill    amLODIPine (NORVASC) 10 MG tablet Take 1 tablet by mouth daily      potassium chloride (KLOR-CON M) 20 MEQ extended release tablet Take 1 tablet by mouth 2 times daily 180 tablet 1    furosemide (LASIX) 40 MG tablet Take 1 tablet by mouth daily      methIMAzole (TAPAZOLE) 5 MG tablet Change to one pill on  MONDAY  and  FRIDAY 30 tablet 1    atorvastatin (LIPITOR) 10 MG tablet Take 1 tablet by mouth daily 90 tablet 3    apixaban (ELIQUIS) 5 MG TABS tablet Take 1 tablet by mouth 2 times daily      amiodarone (CORDARONE) 200 MG tablet TAKE 1/2 TABLET BY MOUTH DAILY       No current

## 2024-11-16 LAB
BUN SERPL-MCNC: 20 MG/DL (ref 8–27)
BUN/CREAT SERPL: 18 (ref 10–24)
CALCIUM SERPL-MCNC: 9.5 MG/DL (ref 8.6–10.2)
CHLORIDE SERPL-SCNC: 104 MMOL/L (ref 96–106)
CO2 SERPL-SCNC: 23 MMOL/L (ref 20–29)
CREAT SERPL-MCNC: 1.14 MG/DL (ref 0.76–1.27)
EGFRCR SERPLBLD CKD-EPI 2021: 63 ML/MIN/1.73
GLUCOSE SERPL-MCNC: 85 MG/DL (ref 70–99)
POTASSIUM SERPL-SCNC: 3.7 MMOL/L (ref 3.5–5.2)
SODIUM SERPL-SCNC: 144 MMOL/L (ref 134–144)

## 2024-11-18 NOTE — RESULT ENCOUNTER NOTE
Called pt, verified two pt identifiers.   Informed pt Kidney function and electrolytes were normal, including potassium level.   Pt verifies understanding, is thankful, no further questions

## 2024-12-30 NOTE — TELEPHONE ENCOUNTER
Requested Prescriptions     Pending Prescriptions Disp Refills    lisinopril (PRINIVIL;ZESTRIL) 40 MG tablet [Pharmacy Med Name: LISINOPRIL 40 MG TABLET] 90 tablet 3     Sig: TAKE 1 TABLET BY MOUTH EVERY DAY     Date of last OV:11/15/2024  Future OV visit scheduled:  [] Yes 4/1/25    Last Refill: [] N/A  Date:1/16/2024  Qty:90  # of refills:3    Med pending for provider review:  [x] Yes  [] No (provide reason why):     Requested Pharmacy updated in ENC:  [x] Yes    Applicable labs (provide date of completion):  [] Diabetic med- A1c:  [x] Cholesterol med- Lipids:  [x] BP med- CMP or BMP:   [x] Thyroid med- TSH:  [] Gout med- Uric acid:  [] Prostate med- PSA:  [] Other (provide what type of med and lab):  7/18/24 10/01/24  Additional notes:

## 2024-12-31 RX ORDER — LISINOPRIL 40 MG/1
40 TABLET ORAL DAILY
Qty: 90 TABLET | Refills: 3 | Status: SHIPPED | OUTPATIENT
Start: 2024-12-31

## 2025-02-10 RX ORDER — ATORVASTATIN CALCIUM 10 MG/1
10 TABLET, FILM COATED ORAL DAILY
Qty: 90 TABLET | Refills: 0 | Status: SHIPPED | OUTPATIENT
Start: 2025-02-10

## 2025-02-18 RX ORDER — POTASSIUM CHLORIDE 1500 MG/1
20 TABLET, EXTENDED RELEASE ORAL 2 TIMES DAILY
Qty: 180 TABLET | Refills: 1 | Status: SHIPPED | OUTPATIENT
Start: 2025-02-18

## 2025-02-18 NOTE — TELEPHONE ENCOUNTER
Requested Prescriptions     Pending Prescriptions Disp Refills    KLOR-CON M20 20 MEQ extended release tablet [Pharmacy Med Name: KLOR-CON M20 TABLET] 180 tablet 1     Sig: TAKE 1 TABLET BY MOUTH TWICE A DAY

## 2025-04-29 ENCOUNTER — OFFICE VISIT (OUTPATIENT)
Age: 85
End: 2025-04-29
Payer: MEDICARE

## 2025-04-29 VITALS
BODY MASS INDEX: 28.73 KG/M2 | WEIGHT: 205.2 LBS | OXYGEN SATURATION: 96 % | HEART RATE: 58 BPM | DIASTOLIC BLOOD PRESSURE: 78 MMHG | HEIGHT: 71 IN | TEMPERATURE: 97.7 F | SYSTOLIC BLOOD PRESSURE: 139 MMHG

## 2025-04-29 DIAGNOSIS — E05.90 HYPERTHYROIDISM: ICD-10-CM

## 2025-04-29 LAB
T4 FREE SERPL-MCNC: 1.2 NG/DL (ref 0.8–1.5)
TSH SERPL DL<=0.05 MIU/L-ACNC: 1.89 UIU/ML (ref 0.36–3.74)

## 2025-04-29 PROCEDURE — 99214 OFFICE O/P EST MOD 30 MIN: CPT | Performed by: INTERNAL MEDICINE

## 2025-04-29 PROCEDURE — 1036F TOBACCO NON-USER: CPT | Performed by: INTERNAL MEDICINE

## 2025-04-29 PROCEDURE — G8427 DOCREV CUR MEDS BY ELIG CLIN: HCPCS | Performed by: INTERNAL MEDICINE

## 2025-04-29 PROCEDURE — G8419 CALC BMI OUT NRM PARAM NOF/U: HCPCS | Performed by: INTERNAL MEDICINE

## 2025-04-29 PROCEDURE — 1123F ACP DISCUSS/DSCN MKR DOCD: CPT | Performed by: INTERNAL MEDICINE

## 2025-04-29 RX ORDER — METHIMAZOLE 5 MG/1
TABLET ORAL
Qty: 30 TABLET | Refills: 1 | Status: SHIPPED | OUTPATIENT
Start: 2025-04-29

## 2025-04-29 NOTE — PROGRESS NOTES
[facility]                Idalia Diaz MD FACE    HISTORY OF PRESENT ILLNESS   Russ Alanis is a 85  y.o. male.   HPI    He is referred to me by his pcp for evaluation of abnormal thyroid labs ,  Last visit  in October 2024     Has h/o afib  and CHF     On amiodarone 100 mg a day   On tapazole 5 mg twice  a  week   Accompanied by wife     Got the med bag, but skipped labs       October 2024   He has persistent afib, CHF - on Amiodarone,  on blood thinner   and suffers from severe leg edema   Pedal edema is increasing     April 2024    Pt had a fall, broke his elbow   Accompanied by daughter     Sept 2023   He is c/o pedal edema   He has diuretics   and  his  K is fluctuant     March 2023   - initial visit    Patient is a pleasant man accompanied by his wife and son.   Wife has a small cute Journal  where she has kept track of   medical info  for more than 20 years  about her 's illnesses and the medications he started on.    Patient has rolling tremors  and  he wants to know If thyroid is causing this issue . He is not a good historian   I gathered info from [facility], pcp notes  and  help of his wife     Patient has longstanding history of atrial fibrillation.  He has been taking amiodarone for many number of years, prescribed by Dr. Gerson Devlin.  Patient is in sinus rhythm per his last notes from September 2022   Patient had multiple times thyroid function test done by his PCP and PCP noted that there is elevation of free T4 and normal TSH values happening since March 2022 onwards      Patient had ER visit for atypical chest pain but was found to have hypokalemia          Review of Systems    As above          Physical Exam    Constitutional:  oriented to person, place, and time.    Neck:  Neck supple. No thyromegaly present.    Leg edema           Labs      Lab Results   Component Value Date    TSH 2.940 09/20/2024    T4FREE 1.55 09/20/2024

## 2025-04-29 NOTE — PATIENT INSTRUCTIONS
SPECIFIC INSTRUCTIONS BELOW     TAKE   tapazole  5 mg   Mon   and  Fri     -----------------------------------------------------------------------       AMIODARONE  200 mg   HALF tab  a  day          -------------PAY ATTENTION TO THESE GENERAL INSTRUCTIONS -----------------      - The medications prescribed at this visit will not be available at pharmacy until 6 pm       - YOUR MED LIST IS NOT UP TO DATE AS SOME CHANGES ARE BEING MADE AFTER THE VISIT - FOLLOW SPECIFIC INSTRUCTIONS  ABOVE     -ANY tests other than blood work, which you opt to do  outside the  [facility] imaging facilities, you are responsible for prior authorizations if  required    - HEALTH MAINTENANCE IS NOT GOING TO BE UP TO DATE ON YOUR AVS- PLEASE IGNORE     Results     *Normal results will not be notified by a phone call starting January 1 2021   *If you have an upcoming visit, the results will be discussed at the visit   *Please sign up for MY CHART if you want access to your lab and test results  *Abnormal results which require immediate attention will be notified by phone call   *Abnormal results which do not require immediate assistance will be notified in 1-2 weeks       Refills    -    have your pharmacy send us a refill request . Refills are done max for one year and a visit is a must before refills are extended    Follow up appointments -  highly encourage you to make it when you are checking out. We can accommodate you into the schedule based on your clinical situation, but not for extending refills beyond a year. Labs are important to give refills and is important to get labs before the visit     Phone calls  -  Allow  24 hrs. for non-urgent calls to be returned  Prior authorization - It may take 2-4 weeks to process  Forms  -  FMLA, DMV etc., will take up to 2 weeks to process  Cancellations - please notify the office 2 days in advance   Samples  - will only be dispensed at visits       If not showing for the appointments and

## 2025-04-29 NOTE — PROGRESS NOTES
Russ Alanis is a 85 y.o. male here for   Chief Complaint   Patient presents with    Thyroid Problem       1. Have you been to the ER, urgent care clinic since your last visit?  Hospitalized since your last visit? - no    2. Have you seen or consulted any other health care providers outside of the [facility] since your last visit?  Include any pap smears or colon screening.- 03/03/25- Cardiologist - routine visit

## 2025-04-30 ENCOUNTER — RESULTS FOLLOW-UP (OUTPATIENT)
Age: 85
End: 2025-04-30

## 2025-04-30 NOTE — TELEPHONE ENCOUNTER
----- Message from Dr. Idalia Diaz MD sent at 4/30/2025  9:27 AM EDT -----  Normal Thyroid labs, inform this elderly pt

## 2025-05-07 DIAGNOSIS — E05.90 HYPERTHYROIDISM: ICD-10-CM

## 2025-05-07 RX ORDER — METHIMAZOLE 5 MG/1
TABLET ORAL
Qty: 24 TABLET | Refills: 3 | Status: SHIPPED | OUTPATIENT
Start: 2025-05-07

## 2025-05-07 NOTE — TELEPHONE ENCOUNTER
Method of communication:  [x]Fax []Phone call []In person   []Other:    Who is making request: PHARMACY  What medication/s (include strength and dosing):    ATORVASTATIN 10 MG  1 TAB DAILY  #90    This is for a:   [x]Refill    []New medication request  []Follow up on prior request    Pharmacy: [facility]  Best contact for patient:    Additional notes:

## 2025-05-08 ENCOUNTER — OFFICE VISIT (OUTPATIENT)
Age: 85
End: 2025-05-08
Payer: MEDICARE

## 2025-05-08 DIAGNOSIS — S42.454D CLOSED NONDISPLACED FRACTURE OF LATERAL CONDYLE OF RIGHT HUMERUS WITH ROUTINE HEALING, SUBSEQUENT ENCOUNTER: ICD-10-CM

## 2025-05-08 DIAGNOSIS — M17.11 PRIMARY OSTEOARTHRITIS OF RIGHT KNEE: Primary | ICD-10-CM

## 2025-05-08 DIAGNOSIS — M25.561 RIGHT KNEE PAIN, UNSPECIFIED CHRONICITY: ICD-10-CM

## 2025-05-08 PROCEDURE — G8419 CALC BMI OUT NRM PARAM NOF/U: HCPCS | Performed by: STUDENT IN AN ORGANIZED HEALTH CARE EDUCATION/TRAINING PROGRAM

## 2025-05-08 PROCEDURE — 1159F MED LIST DOCD IN RCRD: CPT | Performed by: STUDENT IN AN ORGANIZED HEALTH CARE EDUCATION/TRAINING PROGRAM

## 2025-05-08 PROCEDURE — G8427 DOCREV CUR MEDS BY ELIG CLIN: HCPCS | Performed by: STUDENT IN AN ORGANIZED HEALTH CARE EDUCATION/TRAINING PROGRAM

## 2025-05-08 PROCEDURE — 1123F ACP DISCUSS/DSCN MKR DOCD: CPT | Performed by: STUDENT IN AN ORGANIZED HEALTH CARE EDUCATION/TRAINING PROGRAM

## 2025-05-08 PROCEDURE — 99214 OFFICE O/P EST MOD 30 MIN: CPT | Performed by: STUDENT IN AN ORGANIZED HEALTH CARE EDUCATION/TRAINING PROGRAM

## 2025-05-08 PROCEDURE — 1036F TOBACCO NON-USER: CPT | Performed by: STUDENT IN AN ORGANIZED HEALTH CARE EDUCATION/TRAINING PROGRAM

## 2025-05-08 RX ORDER — ATORVASTATIN CALCIUM 10 MG/1
10 TABLET, FILM COATED ORAL DAILY
Qty: 90 TABLET | Refills: 0 | OUTPATIENT
Start: 2025-05-08

## 2025-05-08 ASSESSMENT — PATIENT HEALTH QUESTIONNAIRE - PHQ9
1. LITTLE INTEREST OR PLEASURE IN DOING THINGS: NOT AT ALL
SUM OF ALL RESPONSES TO PHQ QUESTIONS 1-9: 0
2. FEELING DOWN, DEPRESSED OR HOPELESS: NOT AT ALL

## 2025-05-08 NOTE — PROGRESS NOTES
Identified pt with two pt identifiers (name and ). Reviewed chart in preparation for visit and have obtained necessary documentation.     Russ Alanis is a 85 y.o. male New Patient (NPV right knee pain - had a previous surgery, having most of the pain is on the inside of the knee , having some swelling , has to walk with a cane )  .           1. Have you been to the ER, urgent care clinic since your last visit?  Hospitalized since your last visit?  no    2. Have you seen or consulted any other health care providers outside of the [facility] since your last visit?  Include any pap smears or colon screening.  no

## 2025-05-08 NOTE — TELEPHONE ENCOUNTER
Date of last OV:11/15/2025  Future OV visit scheduled:  [x] Yes -> Date:   [] No    Last Refill: [] N/A  Date:2/10/2025  Qty:90  # of refills:0    Med pending for provider review:  [x] Yes  [] No (provide reason why):     Requested Pharmacy updated in ENC:  [] Yes    Applicable labs (provide date of completion):  [] Diabetic med- A1c:  [x] Cholesterol med- Lipids:7/18/24  [] BP med- CMP or BMP:   [] Thyroid med- TSH:  [] Gout med- Uric acid:  [] Prostate med- PSA:  [] Other (provide what type of med and lab):    Additional notes:

## 2025-05-08 NOTE — PROGRESS NOTES
Russ Alanis (:  1940) is a 85 y.o. male,Established patient, here for evaluation of the following chief complaint(s):  New Patient (NPV right knee pain - had a previous surgery, having most of the pain is on the inside of the knee , having some swelling , has to walk with a cane )         Assessment & Plan  Closed nondisplaced fracture of lateral condyle of right humerus with routine healing, subsequent encounter   Noted to have last followed up in 2024. Denied any significant issues, except some mild continuing pain. Plan to discuss further 2025. Plan for x-ray at that time if needed         Right knee pain, unspecified chronicity    Discussed severe osteoarthritis.  Reviewed different methods of management including knee replacement steroid injection as well as the possibility of a medial  brace.  Patient and wife wish to consider steroid injection.  Given the possibility of the mild effusion and to ensure optimal treatment, decision was made to do under ultrasound guidance.  Plan for patient follow-up in ultrasound sports med clinic.    Orders:    XR KNEE RIGHT (3 VIEWS); Future      Assessment & Plan  1. Right knee pain.  - Symptoms include pain during movement, particularly when twisting the foot, and tenderness in a specific spot.  - Physical examination revealed lateral bony tenderness, swelling, and fluid in the knee.  - Discussed the presence of arthritis and potential bone bruise; reviewed options including knee replacement and steroid injection.  - Plan to perform an ultrasound-guided steroid injection, with fluid drainage if necessary. Advised to maintain mobility and consider a medial  brace. If the steroid injection is ineffective after 2 weeks, a gel shot may be considered.      No follow-ups on file.       Subjective   HPI  History of Present Illness  The patient is an 85-year-old male who presents for evaluation of right knee pain.    He reports experiencing

## 2025-05-15 ENCOUNTER — TELEPHONE (OUTPATIENT)
Facility: CLINIC | Age: 85
End: 2025-05-15

## 2025-05-15 NOTE — TELEPHONE ENCOUNTER
Attempted to contact patient regarding upcoming Medicare wellness appointment and completion of HRA questionnaire. Unable to complete at this time will return call

## 2025-05-16 ENCOUNTER — TELEPHONE (OUTPATIENT)
Facility: CLINIC | Age: 85
End: 2025-05-16

## 2025-05-16 SDOH — HEALTH STABILITY: PHYSICAL HEALTH: ON AVERAGE, HOW MANY DAYS PER WEEK DO YOU ENGAGE IN MODERATE TO STRENUOUS EXERCISE (LIKE A BRISK WALK)?: 7 DAYS

## 2025-05-16 ASSESSMENT — LIFESTYLE VARIABLES
HOW MANY STANDARD DRINKS CONTAINING ALCOHOL DO YOU HAVE ON A TYPICAL DAY: 1
HOW OFTEN DO YOU HAVE A DRINK CONTAINING ALCOHOL: MONTHLY OR LESS
HOW MANY STANDARD DRINKS CONTAINING ALCOHOL DO YOU HAVE ON A TYPICAL DAY: 1 OR 2
HOW OFTEN DO YOU HAVE SIX OR MORE DRINKS ON ONE OCCASION: 1
HOW OFTEN DO YOU HAVE A DRINK CONTAINING ALCOHOL: 2

## 2025-05-16 ASSESSMENT — PATIENT HEALTH QUESTIONNAIRE - PHQ9
SUM OF ALL RESPONSES TO PHQ QUESTIONS 1-9: 0
2. FEELING DOWN, DEPRESSED OR HOPELESS: NOT AT ALL
SUM OF ALL RESPONSES TO PHQ QUESTIONS 1-9: 0
1. LITTLE INTEREST OR PLEASURE IN DOING THINGS: NOT AT ALL

## 2025-05-19 ENCOUNTER — OFFICE VISIT (OUTPATIENT)
Facility: CLINIC | Age: 85
End: 2025-05-19
Payer: MEDICARE

## 2025-05-19 VITALS
RESPIRATION RATE: 18 BRPM | HEIGHT: 71 IN | SYSTOLIC BLOOD PRESSURE: 119 MMHG | OXYGEN SATURATION: 98 % | WEIGHT: 204.2 LBS | DIASTOLIC BLOOD PRESSURE: 78 MMHG | TEMPERATURE: 98 F | HEART RATE: 59 BPM | BODY MASS INDEX: 28.59 KG/M2

## 2025-05-19 DIAGNOSIS — E78.5 HYPERLIPIDEMIA, UNSPECIFIED HYPERLIPIDEMIA TYPE: ICD-10-CM

## 2025-05-19 DIAGNOSIS — I10 ESSENTIAL (PRIMARY) HYPERTENSION: ICD-10-CM

## 2025-05-19 DIAGNOSIS — I48.19 PERSISTENT ATRIAL FIBRILLATION (HCC): ICD-10-CM

## 2025-05-19 DIAGNOSIS — Z00.00 MEDICARE ANNUAL WELLNESS VISIT, SUBSEQUENT: Primary | ICD-10-CM

## 2025-05-19 DIAGNOSIS — Z85.46 HISTORY OF PROSTATE CANCER: ICD-10-CM

## 2025-05-19 DIAGNOSIS — E87.6 HYPOKALEMIA: ICD-10-CM

## 2025-05-19 DIAGNOSIS — E05.90 HYPERTHYROIDISM: ICD-10-CM

## 2025-05-19 DIAGNOSIS — R60.0 LOCALIZED EDEMA: ICD-10-CM

## 2025-05-19 DIAGNOSIS — Z12.5 PROSTATE CANCER SCREENING: ICD-10-CM

## 2025-05-19 PROBLEM — I50.9 CONGESTIVE HEART FAILURE, UNSPECIFIED HF CHRONICITY, UNSPECIFIED HEART FAILURE TYPE (HCC): Status: RESOLVED | Noted: 2025-05-19 | Resolved: 2025-05-19

## 2025-05-19 PROBLEM — I50.9 CONGESTIVE HEART FAILURE, UNSPECIFIED HF CHRONICITY, UNSPECIFIED HEART FAILURE TYPE (HCC): Status: ACTIVE | Noted: 2025-05-19

## 2025-05-19 PROCEDURE — 1160F RVW MEDS BY RX/DR IN RCRD: CPT | Performed by: FAMILY MEDICINE

## 2025-05-19 PROCEDURE — G0439 PPPS, SUBSEQ VISIT: HCPCS | Performed by: FAMILY MEDICINE

## 2025-05-19 PROCEDURE — G8419 CALC BMI OUT NRM PARAM NOF/U: HCPCS | Performed by: FAMILY MEDICINE

## 2025-05-19 PROCEDURE — 1123F ACP DISCUSS/DSCN MKR DOCD: CPT | Performed by: FAMILY MEDICINE

## 2025-05-19 PROCEDURE — 1159F MED LIST DOCD IN RCRD: CPT | Performed by: FAMILY MEDICINE

## 2025-05-19 PROCEDURE — 1125F AMNT PAIN NOTED PAIN PRSNT: CPT | Performed by: FAMILY MEDICINE

## 2025-05-19 PROCEDURE — 99213 OFFICE O/P EST LOW 20 MIN: CPT | Performed by: FAMILY MEDICINE

## 2025-05-19 PROCEDURE — G8427 DOCREV CUR MEDS BY ELIG CLIN: HCPCS | Performed by: FAMILY MEDICINE

## 2025-05-19 PROCEDURE — 1036F TOBACCO NON-USER: CPT | Performed by: FAMILY MEDICINE

## 2025-05-19 SDOH — ECONOMIC STABILITY: FOOD INSECURITY: WITHIN THE PAST 12 MONTHS, THE FOOD YOU BOUGHT JUST DIDN'T LAST AND YOU DIDN'T HAVE MONEY TO GET MORE.: NEVER TRUE

## 2025-05-19 SDOH — ECONOMIC STABILITY: FOOD INSECURITY: WITHIN THE PAST 12 MONTHS, YOU WORRIED THAT YOUR FOOD WOULD RUN OUT BEFORE YOU GOT MONEY TO BUY MORE.: NEVER TRUE

## 2025-05-19 NOTE — PATIENT INSTRUCTIONS
device in the bathroom with you.   Where can you learn more?  Go to [email or web address] and enter G117 to learn more about \"Preventing Falls: Care Instructions.\"  Current as of: July 31, 2024  Content Version: 14.4  © 5713-1546 [employer].   Care instructions adapted under license by [employer]. If you have questions about a medical condition or this instruction, always ask your healthcare professional. [employer] disclaims any warranty or liability for your use of this information.         Hearing Loss: Care Instructions  Overview     Hearing loss is a sudden or slow decrease in how well you hear. It can range from slight to profound. Permanent hearing loss can occur with aging. It also can happen when you are exposed long-term to loud noise. Examples include listening to loud music, riding motorcycles, or being around other loud machines.  Hearing loss can affect your work and home life. It can make you feel lonely or depressed. You may feel that you have lost your independence. But hearing aids and other devices can help you hear better and feel connected to others.  Follow-up care is a key part of your treatment and safety. Be sure to make and go to all appointments, and call your doctor if you are having problems. It's also a good idea to know your test results and keep a list of the medicines you take.  How can you care for yourself at home?  Avoid loud noises whenever possible. This helps keep your hearing from getting worse.  Always wear hearing protection around loud noises.  Wear a hearing aid as directed.  A professional can help you pick a hearing aid that will work best for you.  You can also get hearing aids over the counter for mild to moderate hearing loss.  Have hearing tests as your doctor suggests. They can show whether your hearing has changed. Your hearing aid may need to be adjusted.  Use other devices as needed. These may include:  Telephone

## 2025-05-19 NOTE — PROGRESS NOTES
Chief Complaint   Patient presents with    Medicare AWV     /78   Pulse 59   Temp 98 °F (36.7 °C) (Temporal)   Resp 18   Ht 1.803 m (5' 10.98\")   Wt 92.6 kg (204 lb 3.2 oz)   SpO2 98%   BMI 28.49 kg/m²         \"Have you been to the ER, urgent care clinic since your last visit?  Hospitalized since your last visit?\"    NO    “Have you seen or consulted any other health care providers outside our system since your last visit?”    Yes Dr Elizabeth Spence                 
an allergic reaction to compression stockings.    PAST SURGICAL HISTORY:  Prostatectomy for prostate cancer.      Patient's complete Health Risk Assessment and screening values have been reviewed and are found in Flowsheets. The following problems were reviewed today and where indicated follow up appointments were made and/or referrals ordered.    Positive Risk Factor Screenings with Interventions:    Fall Risk:  Do you feel unsteady or are you worried about falling? : (!) (Proxy-Rptd) yes  2 or more falls in past year?: (Proxy-Rptd) no  Fall with injury in past year?: (Proxy-Rptd) no     Interventions:    Patient comments: Discussed using walker full time.   Reviewed medications, home hazards, visual acuity, and co-morbidities that can increase risk for falls                 Hearing Screen:  Do you or your family notice any trouble with your hearing that hasn't been managed with hearing aids?: (!) (Proxy-Rptd) Yes    Interventions:  Patient comments: Followed by ENT/Audiology. Wears hearing aides.     Vision Screen:  Do you have difficulty driving, watching TV, or doing any of your daily activities because of your eyesight?: (Proxy-Rptd) No  Have you had an eye exam within the past year?: (!) (Proxy-Rptd) No                Objective   Vitals:    05/19/25 1000   Resp: 18   Weight: 92.6 kg (204 lb 3.2 oz)   Height: 1.803 m (5' 10.98\")      Body mass index is 28.49 kg/m².        Physical Exam  Respiratory: Clear to auscultation, no wheezing, rales or rhonchi  Cardiovascular: Atrial fibrillation noted, otherwise regular rate and rhythm, no murmurs, rubs, or gallops  Extremities: Some swelling in the ankles, no significant difference between sides            No Known Allergies  Prior to Visit Medications    Medication Sig Taking? Authorizing Provider   atorvastatin (LIPITOR) 10 MG tablet Take 1 tablet by mouth daily  Nuzhat Sterling,    methIMAzole (TAPAZOLE) 5 MG tablet TAKE 1 TABLET ON MON AND FRI  Nugaram,

## 2025-05-20 LAB
ALBUMIN SERPL-MCNC: 4.6 G/DL (ref 3.7–4.7)
ALP SERPL-CCNC: 112 IU/L (ref 44–121)
ALT SERPL-CCNC: 15 IU/L (ref 0–44)
AST SERPL-CCNC: 19 IU/L (ref 0–40)
BILIRUB SERPL-MCNC: 0.7 MG/DL (ref 0–1.2)
BUN SERPL-MCNC: 20 MG/DL (ref 8–27)
BUN/CREAT SERPL: 18 (ref 10–24)
CALCIUM SERPL-MCNC: 9.5 MG/DL (ref 8.6–10.2)
CHLORIDE SERPL-SCNC: 105 MMOL/L (ref 96–106)
CHOLEST SERPL-MCNC: 161 MG/DL (ref 100–199)
CO2 SERPL-SCNC: 20 MMOL/L (ref 20–29)
CREAT SERPL-MCNC: 1.14 MG/DL (ref 0.76–1.27)
EGFRCR SERPLBLD CKD-EPI 2021: 62 ML/MIN/1.73
ERYTHROCYTE [DISTWIDTH] IN BLOOD BY AUTOMATED COUNT: 13.1 % (ref 11.6–15.4)
GLOBULIN SER CALC-MCNC: 2.1 G/DL (ref 1.5–4.5)
GLUCOSE SERPL-MCNC: 78 MG/DL (ref 70–99)
HCT VFR BLD AUTO: 46.9 % (ref 37.5–51)
HDLC SERPL-MCNC: 62 MG/DL
HGB BLD-MCNC: 14.8 G/DL (ref 13–17.7)
LDLC SERPL CALC-MCNC: 85 MG/DL (ref 0–99)
MCH RBC QN AUTO: 29.3 PG (ref 26.6–33)
MCHC RBC AUTO-ENTMCNC: 31.6 G/DL (ref 31.5–35.7)
MCV RBC AUTO: 93 FL (ref 79–97)
PLATELET # BLD AUTO: 283 X10E3/UL (ref 150–450)
POTASSIUM SERPL-SCNC: 4.3 MMOL/L (ref 3.5–5.2)
PROT SERPL-MCNC: 6.7 G/DL (ref 6–8.5)
PSA SERPL-MCNC: 0.7 NG/ML (ref 0–4)
RBC # BLD AUTO: 5.05 X10E6/UL (ref 4.14–5.8)
REFLEX CRITERIA: NORMAL
SODIUM SERPL-SCNC: 143 MMOL/L (ref 134–144)
TRIGL SERPL-MCNC: 76 MG/DL (ref 0–149)
VLDLC SERPL CALC-MCNC: 14 MG/DL (ref 5–40)
WBC # BLD AUTO: 7.4 X10E3/UL (ref 3.4–10.8)

## 2025-05-22 ENCOUNTER — OFFICE VISIT (OUTPATIENT)
Dept: PRIMARY CARE CLINIC | Facility: CLINIC | Age: 85
End: 2025-05-22

## 2025-05-22 VITALS
OXYGEN SATURATION: 98 % | TEMPERATURE: 97.9 F | BODY MASS INDEX: 27.91 KG/M2 | HEART RATE: 82 BPM | SYSTOLIC BLOOD PRESSURE: 136 MMHG | WEIGHT: 200 LBS | DIASTOLIC BLOOD PRESSURE: 81 MMHG

## 2025-05-22 DIAGNOSIS — M17.11 PRIMARY OSTEOARTHRITIS OF RIGHT KNEE: Primary | ICD-10-CM

## 2025-05-22 NOTE — PROGRESS NOTES
Have you been to the ER, urgent care clinic since your last visit?  Hospitalized since your last visit?   NO    Have you seen or consulted any other health care providers outside our system since your last visit?   Yes, [facility]

## 2025-05-22 NOTE — PROGRESS NOTES
Russ Alanis (:  1940) is a 85 y.o. male,Established patient, here for evaluation of the following chief complaint(s):  Follow-up         Assessment & Plan  Primary osteoarthritis of right knee   After reviewing risks and benefits, as well as prognosis, expectations, and alternative methods of management. Decsion was made to proceed with right knee steroid injection and drainage. Patient tolerated procedure well.  Follow-up as needed.  Provided postinjection instructions    Orders:    Joint Aspiration/Injection      Return if symptoms worsen or fail to improve.       Subjective   HPI  Patient is a very pleasant 85-year-old male.  He presents in regards to continued right knee pain, and is open to further ultrasound evaluation/drainage if needed.  He notes that the right knee bothers him but he wished to avoid any knee replacement or surgery such as when he had on his left knee.  He notes that the right humerus has been healing well and has not been causing him any significant trouble and denies any concerns for that.  Review of Systems       Objective   Physical Exam  Musculoskeletal:      Right knee: Effusion present.      Left knee: No effusion.          Joint Aspiration/Injection    Date/Time: 2025 2:45 PM    Performed by: Daniel Spence MD  Authorized by: Daniel Spence MD    Consent:     Consent obtained:  Verbal and written    Consent given by:  Patient    Risks discussed:  Infection, bleeding and pain    Alternatives discussed:  No treatment and alternative treatment  Universal protocol:     Procedure explained and questions answered to patient or proxy's satisfaction: yes      Relevant documents present and verified: yes      Test results available: yes      Imaging studies available: yes      Site/side marked: yes      Patient identity confirmed:  Verbally with patient  Location:     Location:  Knee    Knee:  R knee  Anesthesia:     Anesthesia method:  Local infiltration    Local

## 2025-06-12 NOTE — PROGRESS NOTES
**SPOKE WITH PT, PT AWARE OF APPT**     Electronically signed by Heidi Whitaker on 6/12/2025 at 11:51 AM     Arthritis and thoracic disc disease, can do some small dose of prednisone for a couple weeks to see if this knocks it out Burow's Graft Text: The defect edges were debeveled with a #15 scalpel blade.  Given the location of the defect, shape of the defect, the proximity to free margins and the presence of a standing cone deformity a Burow's skin graft was deemed most appropriate. The standing cone was removed and this tissue was then trimmed to the shape of the primary defect. The adipose tissue was also removed until only dermis and epidermis were left.  The skin margins of the secondary defect were undermined to an appropriate distance in all directions utilizing iris scissors.  The secondary defect was closed with interrupted buried subcutaneous sutures.  The skin edges were then re-apposed with running  sutures.  The skin graft was then placed in the primary defect and oriented appropriately.

## 2025-07-03 ENCOUNTER — RESULTS FOLLOW-UP (OUTPATIENT)
Facility: CLINIC | Age: 85
End: 2025-07-03

## 2025-07-03 NOTE — TELEPHONE ENCOUNTER
----- Message from Dr. Nuzhat Sterling, DO sent at 7/3/2025  6:35 AM EDT -----  Please apologize for the delay in this message!  PSA was normal.   Electrolytes, kidney function, and liver enzymes were normal.   Blood counts were normal.  Cholesterol was good.

## 2025-07-10 ENCOUNTER — OFFICE VISIT (OUTPATIENT)
Age: 85
End: 2025-07-10

## 2025-07-10 VITALS
HEIGHT: 71 IN | OXYGEN SATURATION: 99 % | BODY MASS INDEX: 28.84 KG/M2 | RESPIRATION RATE: 18 BRPM | DIASTOLIC BLOOD PRESSURE: 80 MMHG | HEART RATE: 91 BPM | SYSTOLIC BLOOD PRESSURE: 124 MMHG | WEIGHT: 206 LBS

## 2025-07-10 DIAGNOSIS — H61.21 RIGHT EAR IMPACTED CERUMEN: ICD-10-CM

## 2025-07-10 DIAGNOSIS — H90.3 SENSORINEURAL HEARING LOSS, BILATERAL: Primary | ICD-10-CM

## 2025-07-10 RX ORDER — CHLORHEXIDINE GLUCONATE ORAL RINSE 1.2 MG/ML
SOLUTION DENTAL
COMMUNITY
Start: 2025-06-17

## 2025-07-11 NOTE — PROGRESS NOTES
"Initial /84 (BP Location: Right arm, Patient Position: Sitting, Cuff Size: Adult Regular)   Pulse 99   Temp 96.6  F (35.9  C) (Tympanic)   Resp 10   Ht 1.645 m (5' 4.76\")   Wt 66 kg (145 lb 6.4 oz)   LMP 01/01/2020   SpO2 99%   BMI 24.37 kg/m   Estimated body mass index is 24.37 kg/m  as calculated from the following:    Height as of this encounter: 1.645 m (5' 4.76\").    Weight as of this encounter: 66 kg (145 lb 6.4 oz). .      "
Chief Complaint   Patient presents with    Follow-up     Sensorineural hearing loss, bilateral     Patient states his right ear feels clogged and he doesn't feel right  
methIMAzole (TAPAZOLE) 5 MG tablet TAKE 1 TABLET ON MON AND FRI       Allergies:   No Known Allergies    Social History:   Social History     Tobacco Use    Smoking status: Never    Smokeless tobacco: Never   Vaping Use    Vaping status: Never Used   Substance Use Topics    Alcohol use: Yes     Alcohol/week: 0.8 standard drinks of alcohol    Drug use: No       Family History:  Family History   Problem Relation Age of Onset    Cancer Mother         NOT SURE. GYN ISSUES.     Heart Disease Mother     Stroke Father        Review of Systems:  Consitutional: denies fever, excessive weight gain or loss.  Eyes: denies diplopia, eye pain.  Integumentary: denies new concerning skin lesions.  Ears, Nose, Mouth, Throat: denies except as per HPI.  Endocrine: denies hot or cold intolerance, increased thirst.  Respiratory: denies cough, hemoptysis, wheezing  Gastrointestinal: denies trouble swallowing, nausea, emesis, regurgitation  Musculoskeletal: denies muscle weakness or wasting  Cardiovascular: denies chest pain, shortness of breath  Neurologic: denies seizures, numbness or tingling, syncope  Hematologic: denies easy bleeding or bruising    Physical Examination:   /80 (BP Site: Left Upper Arm, Patient Position: Sitting, BP Cuff Size: Large Adult)   Pulse 91   Resp 18   Ht 1.803 m (5' 10.98\")   Wt 93.4 kg (206 lb)   SpO2 99%   BMI 28.75 kg/m²       General: Comfortable, pleasant, appears stated age  Voice: Strong, speaking in full sentences, no stridor    Face: No masses or lesions, facial strength symmetric   Ears: External ears unremarkable. R ear complete medial impaction, following removal, Bilateral ear canal clear. Tympanic membrane clear and intact, with visible landmarks. Clear middle ear space    PROCEDURE: BILATERAL MICROSCOPY WITH CERUMEN DEBRIDEMENT    Using the microscope, both ears were examined. A 5 Fr suction and alligator were used to debride the right ears of cerumen revealing a clear and intact

## 2025-09-03 ENCOUNTER — TELEPHONE (OUTPATIENT)
Facility: CLINIC | Age: 85
End: 2025-09-03

## 2025-09-03 DIAGNOSIS — E87.6 HYPOKALEMIA: Primary | ICD-10-CM

## 2025-09-04 RX ORDER — POTASSIUM CHLORIDE 1500 MG/1
20 TABLET, EXTENDED RELEASE ORAL 2 TIMES DAILY
Qty: 180 TABLET | Refills: 1 | Status: SHIPPED | OUTPATIENT
Start: 2025-09-04